# Patient Record
Sex: FEMALE | Race: BLACK OR AFRICAN AMERICAN | NOT HISPANIC OR LATINO | Employment: PART TIME | URBAN - METROPOLITAN AREA
[De-identification: names, ages, dates, MRNs, and addresses within clinical notes are randomized per-mention and may not be internally consistent; named-entity substitution may affect disease eponyms.]

---

## 2017-10-31 ENCOUNTER — OFFICE VISIT (OUTPATIENT)
Dept: UROLOGY | Facility: CLINIC | Age: 52
End: 2017-10-31
Attending: UROLOGY
Payer: COMMERCIAL

## 2017-10-31 VITALS
HEART RATE: 68 BPM | BODY MASS INDEX: 25.48 KG/M2 | RESPIRATION RATE: 16 BRPM | HEIGHT: 69 IN | WEIGHT: 172 LBS | DIASTOLIC BLOOD PRESSURE: 72 MMHG | SYSTOLIC BLOOD PRESSURE: 112 MMHG

## 2017-10-31 DIAGNOSIS — K68.2 RETROPERITONEAL FIBROSIS: Chronic | ICD-10-CM

## 2017-10-31 DIAGNOSIS — Z96.0 RETAINED URETERAL STENT: Primary | ICD-10-CM

## 2017-10-31 DIAGNOSIS — N30.10 INTERSTITIAL CYSTITIS: ICD-10-CM

## 2017-10-31 DIAGNOSIS — N13.5 URETERAL STRICTURE: ICD-10-CM

## 2017-10-31 PROCEDURE — 99214 OFFICE O/P EST MOD 30 MIN: CPT | Mod: S$GLB,,, | Performed by: UROLOGY

## 2017-10-31 PROCEDURE — 99999 PR PBB SHADOW E&M-EST. PATIENT-LVL V: CPT | Mod: PBBFAC,,, | Performed by: UROLOGY

## 2017-10-31 PROCEDURE — 87086 URINE CULTURE/COLONY COUNT: CPT

## 2017-10-31 RX ORDER — HYDROCODONE BITARTRATE AND ACETAMINOPHEN 7.5; 325 MG/1; MG/1
TABLET ORAL
COMMUNITY
Start: 2017-10-09 | End: 2017-11-10

## 2017-10-31 NOTE — PROGRESS NOTES
Subjective:       Lizy Jimenez is a 52 y.o. female who is an established patient of Dr. Zaragoza was seen for evaluation of RPF.      She was last seen 12/15 by [facility]. 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 Also with diagnosis of IC (given Elmiron by [facility]). She was managed with indwelling stents that were changes q3mths since 2005. Stents were changed by [facility] 11/15. After that, she established cared with Dr Smith at .     She has not been seen by this practice in almost 2 years. Last stent exchange by [facility] in 11/15 required R URS due to displaced stent (difficult to interpret op note). She reports last stent exchange by Dr Smith was in 7/16 - she is VERY overdue for stent exchange. She did not bring any records from Dr Smith.     In review of her notes, it is very difficult to understand the etiology/workup of her strictures/RPF. She is s/p XRT for cervical cancer. She states that the stents were in place prior to XRT and were done due to kidney stones. She was offered reimplantation but she did not want to do this due to down time from surgery.      She denies flank pain, hematuria. No symptoms of UTI.       The following portions of the patient's history were reviewed and updated as appropriate: allergies, current medications, past family history, past medical history, past social history, past surgical history and problem list.    Review of Systems  Constitutional: no fever or chills  ENT: no nasal congestion or sore throat  Respiratory: no cough or shortness of breath  Cardiovascular: no chest pain or palpitations  Gastrointestinal: no nausea or vomiting, tolerating diet  Genitourinary: as per HPI  Hematologic/Lymphatic: no easy bruising or lymphadenopathy  Musculoskeletal: no arthralgias or myalgias  Skin: no rashes or lesions  Neurological: no seizures or tremors  Behavioral/Psych: no auditory or visual  "hallucinations        Objective:    Vitals: /72   Pulse 68   Resp 16   Ht 5' 9" (1.753 m)   Wt 78 kg (172 lb)   BMI 25.40 kg/m²     Physical Exam   General: well developed, well nourished in no acute distress  Head: normocephalic, atraumatic  Neck: supple, trachea midline, no obvious enlargement of thyroid  HEENT: EOMI, mucus membranes moist, sclera anicteric, no hearing impairment  Lungs: symmetric expansion, non-labored breathing  Cardiovascular: regular rate and rhythm, normal pulses  Abdomen: soft, non tender, non distended, no palpable masses, no hepatosplenomegaly, no hernias, no CVA tenderness  Musculoskeletal: no peripheral edema, normal ROM in bilateral upper and lower extremities  Lymphatics: no cervical or inguinal lymphadenopathy  Skin: no rashes or lesions  Neuro: alert and oriented x 3, no gross deficits  Psych: normal judgment and insight, normal mood/affect and non-anxious  Genitourinary:   patient declined exam      Lab Review   Urine analysis today in clinic shows positive for nitrites, leukocytes, red blood cells     Lab Results   Component Value Date    WBC 11.62 11/04/2015    HGB 11.4 (L) 11/04/2015    HCT 34.9 (L) 11/04/2015    MCV 94 11/04/2015     11/04/2015     Lab Results   Component Value Date    CREATININE 1.2 11/04/2015    BUN 13 11/04/2015       Imaging  Images and reports were personally reviewed by me and discussed with patient         Assessment/Plan:      1. Retained ureteral stent    - Last exchange 7/16   - Needs stents exchange ASAP. Also needs strictures evaluated. Unclear at this time.    - UCx today   - Discussed possible complications with stent encrustation, stone formation. Stents in place for >1 year   - KUB pre-op to assess for stones on stent     2. Retroperitoneal fibrosis    - Unsure how this was diagnosed     3. Ureteral stricture    - Due to stones or XRT - unsure at this time     4. Interstitial cystitis    - Unsure diagnosis   - Was on Elmiron "         Follow up in 6 weeks post-op

## 2017-11-02 ENCOUNTER — TELEPHONE (OUTPATIENT)
Dept: UROLOGY | Facility: CLINIC | Age: 52
End: 2017-11-02

## 2017-11-02 LAB — BACTERIA UR CULT: NORMAL

## 2017-11-10 ENCOUNTER — HOSPITAL ENCOUNTER (OUTPATIENT)
Dept: PREADMISSION TESTING | Facility: HOSPITAL | Age: 52
Discharge: HOME | End: 2017-11-10
Attending: UROLOGY
Payer: COMMERCIAL

## 2017-11-10 VITALS
RESPIRATION RATE: 18 BRPM | SYSTOLIC BLOOD PRESSURE: 124 MMHG | TEMPERATURE: 97 F | HEIGHT: 69 IN | WEIGHT: 172 LBS | HEART RATE: 67 BPM | DIASTOLIC BLOOD PRESSURE: 76 MMHG | BODY MASS INDEX: 25.48 KG/M2 | OXYGEN SATURATION: 100 %

## 2017-11-10 DIAGNOSIS — Z01.818 PRE-OP TESTING: Primary | ICD-10-CM

## 2017-11-10 LAB
ANION GAP SERPL CALC-SCNC: 9 MMOL/L
BASOPHILS # BLD AUTO: 0.02 K/UL
BASOPHILS NFR BLD: 0.3 %
BUN SERPL-MCNC: 11 MG/DL
CALCIUM SERPL-MCNC: 10.1 MG/DL
CHLORIDE SERPL-SCNC: 101 MMOL/L
CO2 SERPL-SCNC: 29 MMOL/L
CREAT SERPL-MCNC: 1 MG/DL
DIFFERENTIAL METHOD: ABNORMAL
EOSINOPHIL # BLD AUTO: 0.2 K/UL
EOSINOPHIL NFR BLD: 2.6 %
ERYTHROCYTE [DISTWIDTH] IN BLOOD BY AUTOMATED COUNT: 11.4 %
EST. GFR  (AFRICAN AMERICAN): >60 ML/MIN/1.73 M^2
EST. GFR  (NON AFRICAN AMERICAN): >60 ML/MIN/1.73 M^2
GLUCOSE SERPL-MCNC: 112 MG/DL
HCT VFR BLD AUTO: 37.3 %
HGB BLD-MCNC: 12.5 G/DL
LYMPHOCYTES # BLD AUTO: 2.6 K/UL
LYMPHOCYTES NFR BLD: 35.5 %
MCH RBC QN AUTO: 31.2 PG
MCHC RBC AUTO-ENTMCNC: 33.5 G/DL
MCV RBC AUTO: 93 FL
MONOCYTES # BLD AUTO: 0.6 K/UL
MONOCYTES NFR BLD: 7.5 %
NEUTROPHILS # BLD AUTO: 4 K/UL
NEUTROPHILS NFR BLD: 54 %
PLATELET # BLD AUTO: 354 K/UL
PMV BLD AUTO: 9 FL
POTASSIUM SERPL-SCNC: 4.3 MMOL/L
RBC # BLD AUTO: 4.01 M/UL
SODIUM SERPL-SCNC: 139 MMOL/L
WBC # BLD AUTO: 7.44 K/UL

## 2017-11-10 PROCEDURE — 80048 BASIC METABOLIC PNL TOTAL CA: CPT

## 2017-11-10 PROCEDURE — 85025 COMPLETE CBC W/AUTO DIFF WBC: CPT

## 2017-11-10 PROCEDURE — 93005 ELECTROCARDIOGRAM TRACING: CPT

## 2017-11-10 PROCEDURE — 93010 ELECTROCARDIOGRAM REPORT: CPT | Mod: ,,, | Performed by: INTERNAL MEDICINE

## 2017-11-10 NOTE — DISCHARGE INSTRUCTIONS
Your surgery is scheduled for____11/17/2017_____________.    Call [phone] between 2 pm and 5 pm ___11/16/2017_________ to find out your arrival time for the day of surgery.    Report to SAME DAY SURGERY UNIT at _______am on the 2nd floor of the hospital.  Use the front entrance of the hospital before 6 am.  If you need wheelchair assistance, call [phone] from your cell phone,  or call 0 from the courtesy phone in the hospital lobby.    Important instructions:   Do not eat or drink after 12 midnight, including water.  It is okay to brush your teeth.  Do not have gum, candy or mints.     Take only these medications with a small swallow of water on the morning of your surgery.____none_________         Please shower the night before and the morning of your surgery.       Do not wear make- up, including mascara.     You may wear deodorant only.      Do not wear powder, body lotion or cologne.     Do not wear any jewelry or have any metal on your body.     Please bring any documents given to you by your doctor.     If you are going home on the same day of surgery, you must have arrangements for a ride home.  You will not be able to drive home if you were given anesthesia or sedation.     Stop taking Aspirin, Ibuprofen, Motrin and Aleve at least 7 days before your surgery. You may use Tylenol.     Stop taking fish oil and vitamin E for least 7 days before surgery.     Wear loose fitting clothes allowing for bandages.     Please leave money and valuables home.       You may bring your cell phone.     Call the doctor if fever or illness should occur before your surgery.    Call [phone] to contact us here at [facility] if needed.

## 2017-11-16 ENCOUNTER — ANESTHESIA EVENT (OUTPATIENT)
Dept: SURGERY | Facility: HOSPITAL | Age: 52
End: 2017-11-16
Payer: COMMERCIAL

## 2017-11-17 ENCOUNTER — SURGERY (OUTPATIENT)
Age: 52
End: 2017-11-17

## 2017-11-17 ENCOUNTER — HOSPITAL ENCOUNTER (OUTPATIENT)
Facility: HOSPITAL | Age: 52
Discharge: HOME | End: 2017-11-17
Attending: UROLOGY | Admitting: UROLOGY
Payer: COMMERCIAL

## 2017-11-17 ENCOUNTER — ANESTHESIA (OUTPATIENT)
Dept: SURGERY | Facility: HOSPITAL | Age: 52
End: 2017-11-17
Payer: COMMERCIAL

## 2017-11-17 VITALS
BODY MASS INDEX: 25.48 KG/M2 | WEIGHT: 172 LBS | SYSTOLIC BLOOD PRESSURE: 119 MMHG | HEART RATE: 70 BPM | HEIGHT: 69 IN | RESPIRATION RATE: 15 BRPM | DIASTOLIC BLOOD PRESSURE: 80 MMHG | TEMPERATURE: 98 F | OXYGEN SATURATION: 98 %

## 2017-11-17 DIAGNOSIS — N13.5 URETERAL STRICTURE: ICD-10-CM

## 2017-11-17 DIAGNOSIS — Z96.0 RETAINED URETERAL STENT: ICD-10-CM

## 2017-11-17 DIAGNOSIS — N13.5 URETER, STRICTURE: Primary | ICD-10-CM

## 2017-11-17 DIAGNOSIS — K68.2 RETROPERITONEAL FIBROSIS: Chronic | ICD-10-CM

## 2017-11-17 PROCEDURE — 88300 SURGICAL PATH GROSS: CPT | Mod: 26,,,

## 2017-11-17 PROCEDURE — 63600175 PHARM REV CODE 636 W HCPCS: Performed by: NURSE ANESTHETIST, CERTIFIED REGISTERED

## 2017-11-17 PROCEDURE — 71000033 HC RECOVERY, INTIAL HOUR: Performed by: UROLOGY

## 2017-11-17 PROCEDURE — 37000009 HC ANESTHESIA EA ADD 15 MINS: Performed by: UROLOGY

## 2017-11-17 PROCEDURE — 36000707: Performed by: UROLOGY

## 2017-11-17 PROCEDURE — 36000706: Performed by: UROLOGY

## 2017-11-17 PROCEDURE — 63600175 PHARM REV CODE 636 W HCPCS: Performed by: UROLOGY

## 2017-11-17 PROCEDURE — 25000003 PHARM REV CODE 250: Performed by: UROLOGY

## 2017-11-17 PROCEDURE — C1769 GUIDE WIRE: HCPCS | Performed by: UROLOGY

## 2017-11-17 PROCEDURE — C2617 STENT, NON-COR, TEM W/O DEL: HCPCS | Performed by: UROLOGY

## 2017-11-17 PROCEDURE — 71000015 HC POSTOP RECOV 1ST HR: Performed by: UROLOGY

## 2017-11-17 PROCEDURE — 37000008 HC ANESTHESIA 1ST 15 MINUTES: Performed by: UROLOGY

## 2017-11-17 PROCEDURE — 71000016 HC POSTOP RECOV ADDL HR: Performed by: UROLOGY

## 2017-11-17 PROCEDURE — 88300 SURGICAL PATH GROSS: CPT

## 2017-11-17 PROCEDURE — 25500020 PHARM REV CODE 255: Performed by: UROLOGY

## 2017-11-17 PROCEDURE — 76000 FLUOROSCOPY <1 HR PHYS/QHP: CPT | Mod: 26,59,, | Performed by: UROLOGY

## 2017-11-17 PROCEDURE — D9220A PRA ANESTHESIA: Mod: ANES,,, | Performed by: ANESTHESIOLOGY

## 2017-11-17 PROCEDURE — 25000003 PHARM REV CODE 250: Performed by: NURSE ANESTHETIST, CERTIFIED REGISTERED

## 2017-11-17 PROCEDURE — 25000003 PHARM REV CODE 250: Performed by: ANESTHESIOLOGY

## 2017-11-17 PROCEDURE — 74420 UROGRAPHY RTRGR +-KUB: CPT | Mod: 26,,, | Performed by: UROLOGY

## 2017-11-17 PROCEDURE — 52332 CYSTOSCOPY AND TREATMENT: CPT | Mod: 50,,, | Performed by: UROLOGY

## 2017-11-17 PROCEDURE — C1758 CATHETER, URETERAL: HCPCS | Performed by: UROLOGY

## 2017-11-17 PROCEDURE — D9220A PRA ANESTHESIA: Mod: CRNA,,, | Performed by: NURSE ANESTHETIST, CERTIFIED REGISTERED

## 2017-11-17 DEVICE — IMPLANTABLE DEVICE: Type: IMPLANTABLE DEVICE | Site: URETER | Status: FUNCTIONAL

## 2017-11-17 DEVICE — STENT URET PERCUFLEX 6FR 22CM: Type: IMPLANTABLE DEVICE | Site: URETER | Status: FUNCTIONAL

## 2017-11-17 RX ORDER — SODIUM CHLORIDE, SODIUM LACTATE, POTASSIUM CHLORIDE, CALCIUM CHLORIDE 600; 310; 30; 20 MG/100ML; MG/100ML; MG/100ML; MG/100ML
INJECTION, SOLUTION INTRAVENOUS CONTINUOUS
Status: DISCONTINUED | OUTPATIENT
Start: 2017-11-17 | End: 2017-11-17 | Stop reason: HOSPADM

## 2017-11-17 RX ORDER — SODIUM CHLORIDE 0.9 % (FLUSH) 0.9 %
3 SYRINGE (ML) INJECTION
Status: DISCONTINUED | OUTPATIENT
Start: 2017-11-17 | End: 2017-11-17 | Stop reason: HOSPADM

## 2017-11-17 RX ORDER — GLYCOPYRROLATE 0.2 MG/ML
INJECTION INTRAMUSCULAR; INTRAVENOUS
Status: DISCONTINUED | OUTPATIENT
Start: 2017-11-17 | End: 2017-11-17

## 2017-11-17 RX ORDER — PHENAZOPYRIDINE HYDROCHLORIDE 100 MG/1
200 TABLET, FILM COATED ORAL 3 TIMES DAILY PRN
Status: DISCONTINUED | OUTPATIENT
Start: 2017-11-17 | End: 2017-11-17 | Stop reason: HOSPADM

## 2017-11-17 RX ORDER — LORAZEPAM 2 MG/ML
0.25 INJECTION INTRAMUSCULAR ONCE AS NEEDED
Status: DISCONTINUED | OUTPATIENT
Start: 2017-11-17 | End: 2017-11-17 | Stop reason: HOSPADM

## 2017-11-17 RX ORDER — PROPOFOL 10 MG/ML
VIAL (ML) INTRAVENOUS
Status: DISCONTINUED | OUTPATIENT
Start: 2017-11-17 | End: 2017-11-17

## 2017-11-17 RX ORDER — ACETAMINOPHEN 325 MG/1
650 TABLET ORAL EVERY 4 HOURS PRN
Status: DISCONTINUED | OUTPATIENT
Start: 2017-11-17 | End: 2017-11-17 | Stop reason: HOSPADM

## 2017-11-17 RX ORDER — POLYETHYLENE GLYCOL 3350 17 G/17G
POWDER, FOR SOLUTION ORAL
COMMUNITY
End: 2019-07-02 | Stop reason: ALTCHOICE

## 2017-11-17 RX ORDER — FENTANYL CITRATE 50 UG/ML
INJECTION, SOLUTION INTRAMUSCULAR; INTRAVENOUS
Status: DISCONTINUED | OUTPATIENT
Start: 2017-11-17 | End: 2017-11-17

## 2017-11-17 RX ORDER — PHENAZOPYRIDINE HYDROCHLORIDE 100 MG/1
200 TABLET, FILM COATED ORAL
Qty: 18 TABLET | Refills: 0 | Status: SHIPPED | OUTPATIENT
Start: 2017-11-17 | End: 2019-07-02 | Stop reason: ALTCHOICE

## 2017-11-17 RX ORDER — HYDROMORPHONE HYDROCHLORIDE 2 MG/ML
0.2 INJECTION, SOLUTION INTRAMUSCULAR; INTRAVENOUS; SUBCUTANEOUS EVERY 5 MIN PRN
Status: DISCONTINUED | OUTPATIENT
Start: 2017-11-17 | End: 2017-11-17 | Stop reason: HOSPADM

## 2017-11-17 RX ORDER — HYDROMORPHONE HYDROCHLORIDE 2 MG/ML
0.5 INJECTION, SOLUTION INTRAMUSCULAR; INTRAVENOUS; SUBCUTANEOUS EVERY 4 HOURS PRN
Status: DISCONTINUED | OUTPATIENT
Start: 2017-11-17 | End: 2017-11-17 | Stop reason: HOSPADM

## 2017-11-17 RX ORDER — LIDOCAINE HCL/PF 100 MG/5ML
SYRINGE (ML) INTRAVENOUS
Status: DISCONTINUED | OUTPATIENT
Start: 2017-11-17 | End: 2017-11-17

## 2017-11-17 RX ORDER — PHENYLEPHRINE HYDROCHLORIDE 10 MG/ML
INJECTION INTRAVENOUS
Status: DISCONTINUED | OUTPATIENT
Start: 2017-11-17 | End: 2017-11-17

## 2017-11-17 RX ORDER — MIDAZOLAM HYDROCHLORIDE 1 MG/ML
INJECTION, SOLUTION INTRAMUSCULAR; INTRAVENOUS
Status: DISCONTINUED | OUTPATIENT
Start: 2017-11-17 | End: 2017-11-17

## 2017-11-17 RX ORDER — LIDOCAINE HYDROCHLORIDE 10 MG/ML
1 INJECTION, SOLUTION EPIDURAL; INFILTRATION; INTRACAUDAL; PERINEURAL ONCE
Status: DISCONTINUED | OUTPATIENT
Start: 2017-11-17 | End: 2017-11-17 | Stop reason: HOSPADM

## 2017-11-17 RX ORDER — HYDROCODONE BITARTRATE AND ACETAMINOPHEN 5; 325 MG/1; MG/1
1 TABLET ORAL EVERY 6 HOURS PRN
Qty: 8 TABLET | Refills: 0 | Status: SHIPPED | OUTPATIENT
Start: 2017-11-17 | End: 2019-07-02 | Stop reason: ALTCHOICE

## 2017-11-17 RX ORDER — ONDANSETRON 2 MG/ML
4 INJECTION INTRAMUSCULAR; INTRAVENOUS EVERY 12 HOURS PRN
Status: DISCONTINUED | OUTPATIENT
Start: 2017-11-17 | End: 2017-11-17 | Stop reason: HOSPADM

## 2017-11-17 RX ORDER — METOCLOPRAMIDE HYDROCHLORIDE 5 MG/ML
INJECTION INTRAMUSCULAR; INTRAVENOUS
Status: DISCONTINUED | OUTPATIENT
Start: 2017-11-17 | End: 2017-11-17

## 2017-11-17 RX ORDER — ONDANSETRON 2 MG/ML
INJECTION INTRAMUSCULAR; INTRAVENOUS
Status: DISCONTINUED | OUTPATIENT
Start: 2017-11-17 | End: 2017-11-17

## 2017-11-17 RX ORDER — SULFAMETHOXAZOLE AND TRIMETHOPRIM 800; 160 MG/1; MG/1
1 TABLET ORAL 2 TIMES DAILY
Qty: 4 TABLET | Refills: 0 | Status: SHIPPED | OUTPATIENT
Start: 2017-11-17 | End: 2017-11-20

## 2017-11-17 RX ORDER — CEFAZOLIN SODIUM 2 G/50ML
2 SOLUTION INTRAVENOUS
Status: COMPLETED | OUTPATIENT
Start: 2017-11-17 | End: 2017-11-17

## 2017-11-17 RX ORDER — FENTANYL CITRATE 50 UG/ML
25 INJECTION, SOLUTION INTRAMUSCULAR; INTRAVENOUS EVERY 5 MIN PRN
Status: DISCONTINUED | OUTPATIENT
Start: 2017-11-17 | End: 2017-11-17 | Stop reason: HOSPADM

## 2017-11-17 RX ORDER — HYDROCODONE BITARTRATE AND ACETAMINOPHEN 5; 325 MG/1; MG/1
1 TABLET ORAL EVERY 4 HOURS PRN
Status: DISCONTINUED | OUTPATIENT
Start: 2017-11-17 | End: 2017-11-17 | Stop reason: HOSPADM

## 2017-11-17 RX ADMIN — GLYCOPYRROLATE 0.2 MG: 0.2 INJECTION, SOLUTION INTRAMUSCULAR; INTRAVENOUS at 08:11

## 2017-11-17 RX ADMIN — PROPOFOL 20 MG: 10 INJECTION, EMULSION INTRAVENOUS at 09:11

## 2017-11-17 RX ADMIN — IOHEXOL 100 ML: 300 INJECTION, SOLUTION INTRAVENOUS at 09:11

## 2017-11-17 RX ADMIN — CEFAZOLIN SODIUM 2 G: 2 SOLUTION INTRAVENOUS at 09:11

## 2017-11-17 RX ADMIN — SODIUM CHLORIDE, SODIUM LACTATE, POTASSIUM CHLORIDE, AND CALCIUM CHLORIDE: .6; .31; .03; .02 INJECTION, SOLUTION INTRAVENOUS at 07:11

## 2017-11-17 RX ADMIN — PHENYLEPHRINE HYDROCHLORIDE 200 MCG: 10 INJECTION INTRAVENOUS at 09:11

## 2017-11-17 RX ADMIN — PROPOFOL 140 MG: 10 INJECTION, EMULSION INTRAVENOUS at 09:11

## 2017-11-17 RX ADMIN — METOCLOPRAMIDE 10 MG: 5 INJECTION, SOLUTION INTRAMUSCULAR; INTRAVENOUS at 08:11

## 2017-11-17 RX ADMIN — ONDANSETRON 4 MG: 2 INJECTION, SOLUTION INTRAMUSCULAR; INTRAVENOUS at 08:11

## 2017-11-17 RX ADMIN — FENTANYL CITRATE 100 MCG: 50 INJECTION INTRAMUSCULAR; INTRAVENOUS at 09:11

## 2017-11-17 RX ADMIN — HYDROCODONE BITARTRATE AND ACETAMINOPHEN 1 TABLET: 5; 325 TABLET ORAL at 10:11

## 2017-11-17 RX ADMIN — PHENAZOPYRIDINE HYDROCHLORIDE 200 MG: 100 TABLET ORAL at 10:11

## 2017-11-17 RX ADMIN — LIDOCAINE HYDROCHLORIDE 100 MG: 20 INJECTION, SOLUTION INTRAVENOUS at 09:11

## 2017-11-17 RX ADMIN — ONDANSETRON 4 MG: 2 INJECTION, SOLUTION INTRAMUSCULAR; INTRAVENOUS at 12:11

## 2017-11-17 RX ADMIN — MIDAZOLAM HYDROCHLORIDE 2 MG: 1 INJECTION, SOLUTION INTRAMUSCULAR; INTRAVENOUS at 08:11

## 2017-11-17 NOTE — PLAN OF CARE
Problem: Patient Care Overview  Goal: Plan of Care Review  Outcome: Ongoing (interventions implemented as appropriate)   11/17/17 1035   Coping/Psychosocial   Plan Of Care Reviewed With patient     Nick score 10/10.  VSS per flow sheet. AAOx4; drowsy. No n/v noted. Denies pain. See chart for full assessment.     Problem: Surgery Nonspecified (Adult)  Goal: Signs and Symptoms of Listed Potential Problems Will be Absent, Minimized or Managed (Surgery Nonspecified)  Signs and symptoms of listed potential problems will be absent, minimized or managed by discharge/transition of care (reference Surgery Nonspecified (Adult) CPG).   Outcome: Ongoing (interventions implemented as appropriate)   11/17/17 1035   Surgery Nonspecified   Problems Assessed (Surgery) bleeding/anemia;pain;postoperative nausea and vomiting;respiratory compromise;situational response   Problems Present (Surgery) none     Goal: Anesthesia/Sedation Recovery  Outcome: Outcome(s) achieved Date Met: 11/17/17 11/17/17 1035   Goal/Outcome Evaluation   Anesthesia/Sedation Recovery recovered to baseline;criteria met for transfer

## 2017-11-17 NOTE — ANESTHESIA POSTPROCEDURE EVALUATION
"Anesthesia Post Evaluation    Patient: Lizy Jimenez    Procedure(s) Performed: Procedure(s) (LRB):  CYSTOSCOPY WITH STENT REMOVAL (Bilateral)  PYELOGRAM-RETROGRADE (Bilateral)  PLACEMENT-STENT (Bilateral)    Final Anesthesia Type: general  Patient location during evaluation: PACU  Patient participation: Yes- Able to Participate  Level of consciousness: awake and alert, oriented and awake  Post-procedure vital signs: reviewed and stable  Pain management: adequate  Airway patency: patent  PONV status at discharge: No PONV  Anesthetic complications: no      Cardiovascular status: blood pressure returned to baseline  Respiratory status: unassisted and spontaneous ventilation  Hydration status: euvolemic  Follow-up not needed.        Visit Vitals  /82   Pulse 90   Temp 36.5 °C (97.7 °F) (Oral)   Resp (!) 9   Ht 5' 9" (1.753 m)   Wt 78 kg (172 lb)   SpO2 99%   Breastfeeding? No   BMI 25.40 kg/m²       Pain/Nick Score: Pain Assessment Performed: Yes (11/17/2017 10:00 AM)  Presence of Pain: denies (11/17/2017 10:00 AM)  Nick Score: 8 (11/17/2017 10:00 AM)      "

## 2017-11-17 NOTE — INTERVAL H&P NOTE
The patient has been examined and the H&P has been reviewed:    I concur with the findings and no changes have occurred since H&P was written.    Anesthesia/Surgery risks, benefits and alternative options discussed and understood by patient/family.          Active Hospital Problems    Diagnosis  POA    Retained ureteral stent [Z96.0]  Not Applicable      Resolved Hospital Problems    Diagnosis Date Resolved POA   No resolved problems to display.

## 2017-11-17 NOTE — ANESTHESIA PREPROCEDURE EVALUATION
11/17/2017  Lizy Jimenez is a 52 y.o., female.    Anesthesia Evaluation    I have reviewed the Patient Summary Reports.     I have reviewed the Medications.     Review of Systems  Anesthesia Hx:  No problems with previous Anesthesia   Denies Personal Hx of Anesthesia complications.   Hematology/Oncology:  Hematology Normal   Oncology Normal     EENT/Dental:EENT/Dental Normal   Cardiovascular:   Hypertension    Pulmonary:  Pulmonary Normal    Renal/:   Chronic Renal Disease    Hepatic/GI:  Hepatic/GI Normal    Musculoskeletal:  Musculoskeletal Normal    Neurological:  Neurology Normal    Endocrine:  Endocrine Normal    Dermatological:  Skin Normal    Psych:  Psychiatric Normal           Physical Exam  General:  Well nourished    Airway/Jaw/Neck:  Airway Findings: Mouth Opening: Normal Tongue: Normal  Mallampati: II      Dental:  Dental Findings: In tact   Chest/Lungs:  Chest/Lungs Clear    Heart/Vascular:  Heart Findings: Normal Heart murmur: negative       Mental Status:  Mental Status Findings:  Cooperative, Alert and Oriented         Anesthesia Plan  Type of Anesthesia, risks & benefits discussed:  Anesthesia Type:  general, MAC, regional  Patient's Preference:   Intra-op Monitoring Plan: standard ASA monitors  Intra-op Monitoring Plan Comments:   Post Op Pain Control Plan: multimodal analgesia  Post Op Pain Control Plan Comments:   Induction:   IV  Beta Blocker:  Patient is not currently on a Beta-Blocker (No further documentation required).       Informed Consent: Patient understands risks and agrees with Anesthesia plan.  Questions answered. Anesthesia consent signed with patient.  ASA Score: 2     Day of Surgery Review of History & Physical:            Ready For Surgery From Anesthesia Perspective.

## 2017-11-17 NOTE — BRIEF OP NOTE
[facility]  Brief Operative Note     SUMMARY     Surgery Date: 11/17/2017     Surgeon(s) and Role:     * Dena Paula MD - Primary    Assisting Surgeon: None    Pre-op Diagnosis:  Ureteral stricture [N13.5]  Retained ureteral stent [Z96.0]    Post-op Diagnosis:  Post-Op Diagnosis Codes:     * Ureteral stricture [N13.5]     * Retained ureteral stent [Z96.0]    Procedure(s) (LRB):  CYSTOSCOPY WITH STENT REMOVAL (Bilateral)  PYELOGRAM-RETROGRADE (Bilateral)  PLACEMENT-STENT (Bilateral) - 2 stents on L, 1 stent on R    Anesthesia: Choice    Description of the findings of the procedure: No excrustation on stents despite being in place >1 year. Narrowed ureters distally bilaterally, R>L. R hydronephrosis. Partially duplicated R, totally duplicated L.    Findings/Key Components: R - 6Fr x 22cm. LUP - 6Fr x 22cm, LLP - 6Fr x 20cm    Estimated Blood Loss: 5cm         Specimens:   Specimen (12h ago through future)    Start     Ordered    11/17/17 0929  Specimen to Pathology - Surgery  Once     Comments:  Right x1 / left x2 ureteral stents GROSS ONLY      11/17/17 0946          Discharge Note    SUMMARY     Admit Date: 11/17/2017    Discharge Date and Time:  11/17/2017 1:32 PM    Hospital Course (synopsis of major diagnoses, care, treatment, and services provided during the course of the hospital stay): Uncomplicated stent exchange     Final Diagnosis: Post-Op Diagnosis Codes:     * Ureteral stricture [N13.5]     * Retained ureteral stent [Z96.0]    Disposition: Home or Self Care    Follow Up/Patient Instructions:     Medications:  Reconciled Home Medications:   Current Discharge Medication List      START taking these medications    Details   hydrocodone-acetaminophen 5-325mg (NORCO) 5-325 mg per tablet Take 1 tablet by mouth every 6 (six) hours as needed for Pain.  Qty: 8 tablet, Refills: 0      phenazopyridine (PYRIDIUM) 100 MG tablet Take 2 tablets (200 mg total) by mouth 3 (three) times daily  with meals.  Qty: 18 tablet, Refills: 0      sulfamethoxazole-trimethoprim 800-160mg (BACTRIM DS) 800-160 mg Tab Take 1 tablet by mouth 2 (two) times daily.  Qty: 4 tablet, Refills: 0         CONTINUE these medications which have NOT CHANGED    Details   lisinopril 10 MG tablet Take 10 mg by mouth once daily.   Refills: 0      polyethylene glycol (GLYCOLAX) 17 gram PwPk Take by mouth as needed.             Discharge Procedure Orders  Diet general     Activity as tolerated     Call MD for:  temperature >100.4     Call MD for:  persistent nausea and vomiting     Call MD for:  severe uncontrolled pain     Call MD for:  difficulty breathing, headache or visual disturbances     No dressing needed       Follow-up Information     Dena Paula MD In 6 weeks.    Specialty:  Urology  Why:  For post-op follow up  Contact information:  [address] [place] [id number]  [phone]                   #[id number]

## 2017-11-17 NOTE — TRANSFER OF CARE
"Anesthesia Transfer of Care Note    Patient: Lziy Jimenez    Procedure(s) Performed: Procedure(s) (LRB):  CYSTOSCOPY WITH STENT REMOVAL (Bilateral)  PYELOGRAM-RETROGRADE (Bilateral)  PLACEMENT-STENT (Bilateral)    Patient location: PACU    Anesthesia Type: general    Transport from OR: Transported from OR on room air with adequate spontaneous ventilation    Post pain: adequate analgesia    Post assessment: no apparent anesthetic complications and tolerated procedure well    Post vital signs: stable    Level of consciousness: awake, alert and oriented    Nausea/Vomiting: no nausea/vomiting    Complications: none    Transfer of care protocol was followed      Last vitals:   Visit Vitals  /78   Pulse 100   Temp 36.5 °C (97.7 °F) (Oral)   Resp 14   Ht 5' 9" (1.753 m)   Wt 78 kg (172 lb)   SpO2 100%   Breastfeeding? No   BMI 25.40 kg/m²     "

## 2017-11-18 NOTE — OP NOTE
DATE OF PROCEDURE:  11/17/2017    SURGEON:  Dena Paula M.D.    PREOPERATIVE DIAGNOSIS:  Ureteral stricture and retained ureteral stent.    POSTOPERATIVE DIAGNOSIS: Ureteral stricture and retained ureteral stent.    PROCEDURE PERFORMED:  Cystoscopy with bilateral stent removal, bilateral   retrograde pyelogram and bilateral ureteral stent placement with 2 stents placed   on the left.    INDICATIONS FOR PROCEDURE:  Ms. Jimenez is a 52-year-old female who presented to   my clinic recently with a known history of ureteral strictures and possible   retroperitoneal fibrosis.  She is status post radiation therapy for cervical   cancer.  She was previously managed by Dr. Zaragoza with indwelling ureteral   stents.  Since that time, she was managed at an outside hospital, last stent   exchange was July 2016.  She had been getting these changed every 3 months.  She   is now approximately a year and a half since her last change.  She presents   today for exchange of her ureteral stents.  She has a known completely   duplicated system on the left.    PROCEDURE:  Ms. Jimenez was brought to the Operating Room and placed under   general LMA anesthesia.  Full timeout procedures were performed identifying the   correct patient and procedure.  Appropriate IV antibiotics with Ancef was given   prior to commencement of surgery.  The patient was placed in dorsal lithotomy   position and prepped and draped in the usual sterile fashion.    Preoperatively, she underwent a KUB, which showed excessive curls of the stent   due to the type of stent that was placed at the outside hospital.  There was no   definitive stone encrustation that was able to be visualized on KUB.     imaging was done with fluoroscopy in the Operating Room and again confirmed   this.  A 22-Kyrgyz rigid cystoscope was passed per urethra and into the bladder.    The stents were examined and were noted to be dark in nature, but had no stone   encrustation.   First the right side was addressed and a sensor wire was passed   alongside the stent and up to the level of the kidney.  Once the wire was in   place, the stent was grasped and removed in its entirety.  The stent was able to   be removed with minimal to no resistance.  Next, a dual lumen exchange catheter   was passed over the wire and a retrograde pyelogram was performed with full   strength Omnipaque solution.  There was noted to be narrow distal ureter and   moderate hydroureteronephrosis proximal to this.  She was noted to have a   partially duplicated system, but one ureter down from the proximal ureter down   to the bladder.  Next, a cystoscope was replaced back into the bladder over the   wire.  A 6-Martiniquais x 22 cm double-J ureteral stent with no string was then passed   over the wire and up to the level of the kidney.  The wire was removed and good   curls noted in the proximal and distal portion of the stent.    Similar procedure was then performed on the patient's left side.  Initially, the   upper pole was addressed and the wire was passed alongside the stent and up to   the level of the kidney.  The stent was then removed.  A similar procedure was   performed for the lower pole moiety where stent was passed alongside the stent   and up to the level of the kidney.  Both stents were then removed without issue   under fluoroscopic guidance.  There was no resistance in removal of the stent.    A dual-lumen exchange catheter was then used to perform retrograde pyelogram per   upper pole moiety and then a lower pole moiety.  Again, there was narrowing of   the distal ureter.  It is difficult in getting contrast to the area of the upper   pole moiety of the kidney.    The cystoscope was replaced back into the bladder and first the upper pole   moiety stent was placed.  A 6-Martiniquais x 22 cm JJ stent was then passed over the   wire and up to the level of the kidney.  The wire was removed and coil was noted   in the  proximal portion and distal portion of the stent.  Similar procedure was   then performed on the lower pole moiety where a 6-Kinyarwanda x 20 cm JJ stent was   then passed over the wire to the level of the kidney.  The wire was then removed   and good curl is noted in the proximal and distal portion of the stent.  At the   conclusion of the procedure, all 3 stents were in the proper position.  There   was minimal to no hematuria noted from the ureters.    The bladder was then drained and the cystoscope was removed.  The patient was   awakened from general anesthesia and transferred to the PACU in stable   condition.    COMPLICATIONS:  None.    FINDINGS:  No encrustation of stent despite being in place for greater than one   year.  Narrow distal ureters bilaterally on retrograde pyelogram, right greater   than left.  Right hydronephrosis.  Partially duplicated right system, entirely   duplicated left system.    ESTIMATED BLOOD LOSS:  5 mL.    SPECIMENS:  Ureteral stents.    DRAINS:  The right 6-Kinyarwanda x 22 JJ stent, left upper pole moiety 6-Kinyarwanda x 22   cm JJ, the left lower pole moiety 6-Kinyarwanda x 20 cm double-J ureteral stent.    PATIENT DISPOSITION:  The patient is stable.  She will be discharged home.  She   will follow up in approximately 6 weeks.  We will likely need to exchange her   stent in approximately four to six months.  She did not have any encrustation,   so doubt that stents need to be changed q. 3 months.      EKP/IN  dd: 11/17/2017 13:40:08 (CST)  td: 11/17/2017 21:37:26 (CST)  Doc ID   #[id number]  Job ID #[id number]    CC:

## 2017-12-12 ENCOUNTER — OFFICE VISIT (OUTPATIENT)
Dept: UROLOGY | Facility: CLINIC | Age: 52
End: 2017-12-12
Payer: COMMERCIAL

## 2017-12-12 VITALS
SYSTOLIC BLOOD PRESSURE: 104 MMHG | BODY MASS INDEX: 25.47 KG/M2 | WEIGHT: 171.94 LBS | RESPIRATION RATE: 14 BRPM | HEART RATE: 68 BPM | HEIGHT: 69 IN | DIASTOLIC BLOOD PRESSURE: 60 MMHG

## 2017-12-12 DIAGNOSIS — K68.2 RETROPERITONEAL FIBROSIS: Chronic | ICD-10-CM

## 2017-12-12 DIAGNOSIS — N13.5 URETERAL STRICTURE: Primary | ICD-10-CM

## 2017-12-12 DIAGNOSIS — N30.10 INTERSTITIAL CYSTITIS: ICD-10-CM

## 2017-12-12 DIAGNOSIS — N13.1 HYDRONEPHROSIS WITH URETERAL STRICTURE, NOT ELSEWHERE CLASSIFIED: ICD-10-CM

## 2017-12-12 PROCEDURE — 87186 SC STD MICRODIL/AGAR DIL: CPT

## 2017-12-12 PROCEDURE — 99214 OFFICE O/P EST MOD 30 MIN: CPT | Mod: S$GLB,,, | Performed by: UROLOGY

## 2017-12-12 PROCEDURE — 87086 URINE CULTURE/COLONY COUNT: CPT

## 2017-12-12 PROCEDURE — 99999 PR PBB SHADOW E&M-EST. PATIENT-LVL III: CPT | Mod: PBBFAC,,, | Performed by: UROLOGY

## 2017-12-12 PROCEDURE — 87077 CULTURE AEROBIC IDENTIFY: CPT

## 2017-12-12 PROCEDURE — 87088 URINE BACTERIA CULTURE: CPT

## 2017-12-12 NOTE — PROGRESS NOTES
Subjective:       Lizy Jimenez is a 52 y.o. female who is an established patient of Dr. Zaragoza was seen for evaluation of RPF.      She was last seen 12/15 by [facility]. 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 Also with diagnosis of IC (given Elmiron by R[facility]). She was managed with indwelling stents that were changes q3mths since 2005. Stents were changed by [facility] 11/15. After that, she established cared with Dr Smith at .     She has not been seen by this practice in almost 2 years. Last stent exchange by [facility] in 11/15 required R URS due to displaced stent (difficult to interpret op note). She reports last stent exchange by Dr Smith was in 7/16 - she is VERY overdue for stent exchange. She did not bring any records from Dr Smith.     In review of her notes, it is very difficult to understand the etiology/workup of her strictures/RPF. She is s/p XRT for cervical cancer. She states that the stents were in place prior to XRT and were done due to kidney stones. She was offered reimplantation but she did not want to do this due to down time from surgery.      Stent exchange (uncomplicated) on 11/17/17. R - 6Fr x 22cm. LUP - 6Fr x 22cm, LLP - 6Fr x 20cm    Now with dysuria. UA concerning for UTI.       The following portions of the patient's history were reviewed and updated as appropriate: allergies, current medications, past family history, past medical history, past social history, past surgical history and problem list.    Review of Systems  Constitutional: no fever or chills  ENT: no nasal congestion or sore throat  Respiratory: no cough or shortness of breath  Cardiovascular: no chest pain or palpitations  Gastrointestinal: no nausea or vomiting, tolerating diet  Genitourinary: as per HPI  Hematologic/Lymphatic: no easy bruising or lymphadenopathy  Musculoskeletal: no arthralgias or myalgias  Skin: no rashes or  "lesions  Neurological: no seizures or tremors  Behavioral/Psych: no auditory or visual hallucinations        Objective:    Vitals: /60   Pulse 68   Resp 14   Ht 5' 9" (1.753 m)   Wt 78 kg (171 lb 15.3 oz)   BMI 25.39 kg/m²     Physical Exam   General: well developed, well nourished in no acute distress  Head: normocephalic, atraumatic  Neck: supple, trachea midline, no obvious enlargement of thyroid  HEENT: EOMI, mucus membranes moist, sclera anicteric, no hearing impairment  Lungs: symmetric expansion, non-labored breathing  Cardiovascular: regular rate and rhythm, normal pulses  Abdomen: soft, non tender, non distended, no palpable masses, no hepatosplenomegaly, no hernias, no CVA tenderness  Musculoskeletal: no peripheral edema, normal ROM in bilateral upper and lower extremities  Lymphatics: no cervical or inguinal lymphadenopathy  Skin: no rashes or lesions  Neuro: alert and oriented x 3, no gross deficits  Psych: normal judgment and insight, normal mood/affect and non-anxious  Genitourinary:   patient declined exam      Lab Review   Urine analysis today in clinic shows positive for nitrites, leukocytes, red blood cells 50    Lab Results   Component Value Date    WBC 7.44 11/10/2017    HGB 12.5 11/10/2017    HCT 37.3 11/10/2017    MCV 93 11/10/2017     (H) 11/10/2017     Lab Results   Component Value Date    CREATININE 1.0 11/10/2017    BUN 11 11/10/2017       Imaging  Images and reports were personally reviewed by me and discussed with patient         Assessment/Plan:      1. Retained ureteral stent    - Stent exchange delayed >1 year   - Stents exchanged by myself on 11/17/17 without complication despite prolonged indwelling time. Minimal encrustation.   - Okay to plan for stent exchange in 6 months   - Discussed referral to Dr Santacruz for definitive repair   - UCx today      2. Retroperitoneal fibrosis    - Unsure how this was diagnosed     3. Ureteral stricture    - Due to stones or XRT - " unsure at this time     4. Interstitial cystitis    - Unsure diagnosis   - Was on Elmiron         Follow up in 5 months to arrange for stent exchange

## 2017-12-15 ENCOUNTER — TELEPHONE (OUTPATIENT)
Dept: UROLOGY | Facility: CLINIC | Age: 52
End: 2017-12-15

## 2017-12-15 ENCOUNTER — TELEPHONE (OUTPATIENT)
Dept: UROLOGY | Facility: HOSPITAL | Age: 52
End: 2017-12-15

## 2017-12-15 LAB — BACTERIA UR CULT: NORMAL

## 2017-12-15 RX ORDER — NITROFURANTOIN 25; 75 MG/1; MG/1
100 CAPSULE ORAL 2 TIMES DAILY
Qty: 20 CAPSULE | Refills: 0 | Status: SHIPPED | OUTPATIENT
Start: 2017-12-15 | End: 2017-12-25

## 2017-12-15 NOTE — TELEPHONE ENCOUNTER
Please inform patient of urinary tract infection on recent urine culture. Appropriate antibiotics (Macrobid) were sent in to the pharmacy.

## 2019-06-25 ENCOUNTER — TELEPHONE (OUTPATIENT)
Dept: UROLOGY | Facility: CLINIC | Age: 54
End: 2019-06-25

## 2019-06-25 NOTE — TELEPHONE ENCOUNTER
Lt message for pt to contact office to see why pt is needing a sooner appt an also appt can be made with rosy landin.-todd

## 2019-06-25 NOTE — TELEPHONE ENCOUNTER
----- Message from Tiffany Levy sent at 6/25/2019 10:46 AM CDT -----  Contact: PT  PT is calling requesting an early morning appointment for anything really soon    Callback: [phone]

## 2019-07-02 ENCOUNTER — OFFICE VISIT (OUTPATIENT)
Dept: UROLOGY | Facility: CLINIC | Age: 54
End: 2019-07-02
Payer: COMMERCIAL

## 2019-07-02 VITALS
HEIGHT: 69 IN | DIASTOLIC BLOOD PRESSURE: 60 MMHG | BODY MASS INDEX: 25.18 KG/M2 | SYSTOLIC BLOOD PRESSURE: 104 MMHG | WEIGHT: 170 LBS

## 2019-07-02 DIAGNOSIS — Z96.0 RETAINED URETERAL STENT: Primary | ICD-10-CM

## 2019-07-02 DIAGNOSIS — K68.2 RETROPERITONEAL FIBROSIS: Chronic | ICD-10-CM

## 2019-07-02 DIAGNOSIS — K68.2 RETROPERITONEAL FIBROSIS: ICD-10-CM

## 2019-07-02 DIAGNOSIS — N13.5 URETERAL STRICTURE: ICD-10-CM

## 2019-07-02 DIAGNOSIS — N13.1 HYDRONEPHROSIS WITH URETERAL STRICTURE, NOT ELSEWHERE CLASSIFIED: ICD-10-CM

## 2019-07-02 LAB
BILIRUB SERPL-MCNC: NORMAL MG/DL
BLOOD URINE, POC: 50
COLOR, POC UA: YELLOW
GLUCOSE UR QL STRIP: 1000
KETONES UR QL STRIP: NORMAL
LEUKOCYTE ESTERASE URINE, POC: NORMAL
NITRITE, POC UA: POSITIVE
PH, POC UA: 5
PROTEIN, POC: NORMAL
SPECIFIC GRAVITY, POC UA: 1010
UROBILINOGEN, POC UA: NORMAL

## 2019-07-02 PROCEDURE — 87086 URINE CULTURE/COLONY COUNT: CPT

## 2019-07-02 PROCEDURE — 87088 URINE BACTERIA CULTURE: CPT

## 2019-07-02 PROCEDURE — 81001 URINALYSIS AUTO W/SCOPE: CPT | Mod: S$GLB,,, | Performed by: NURSE PRACTITIONER

## 2019-07-02 PROCEDURE — 3008F BODY MASS INDEX DOCD: CPT | Mod: CPTII,S$GLB,, | Performed by: NURSE PRACTITIONER

## 2019-07-02 PROCEDURE — 81001 PR  URINALYSIS, AUTO, W/SCOPE: ICD-10-PCS | Mod: S$GLB,,, | Performed by: NURSE PRACTITIONER

## 2019-07-02 PROCEDURE — 87077 CULTURE AEROBIC IDENTIFY: CPT

## 2019-07-02 PROCEDURE — 99999 PR PBB SHADOW E&M-EST. PATIENT-LVL III: CPT | Mod: PBBFAC,,, | Performed by: NURSE PRACTITIONER

## 2019-07-02 PROCEDURE — 99214 PR OFFICE/OUTPT VISIT, EST, LEVL IV, 30-39 MIN: ICD-10-PCS | Mod: 25,S$GLB,, | Performed by: NURSE PRACTITIONER

## 2019-07-02 PROCEDURE — 99214 OFFICE O/P EST MOD 30 MIN: CPT | Mod: 25,S$GLB,, | Performed by: NURSE PRACTITIONER

## 2019-07-02 PROCEDURE — 87186 SC STD MICRODIL/AGAR DIL: CPT

## 2019-07-02 PROCEDURE — 3008F PR BODY MASS INDEX (BMI) DOCUMENTED: ICD-10-PCS | Mod: CPTII,S$GLB,, | Performed by: NURSE PRACTITIONER

## 2019-07-02 PROCEDURE — 99999 PR PBB SHADOW E&M-EST. PATIENT-LVL III: ICD-10-PCS | Mod: PBBFAC,,, | Performed by: NURSE PRACTITIONER

## 2019-07-02 RX ORDER — ATORVASTATIN CALCIUM 20 MG/1
20 TABLET, FILM COATED ORAL DAILY
COMMUNITY

## 2019-07-02 NOTE — PROGRESS NOTES
"Subjective:       Patient ID: Lizy Jimenez is a 53 y.o. female who is an established patient of Dr. Paula though new to me was last seen in this office 12/12/17    Chief Complaint:   Chief Complaint   Patient presents with    Other     Discuss procedure and follow up.. states she has been fine      Per Dr. Paula:  She was last seen 12/15 by [facility]. 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 Also with diagnosis of IC (given Elmiron by [facility]). She was managed with indwelling stents that were changes q3mths since 2005. Stents were changed by [facility] 11/15. After that, she established cared with Dr Smith at .     She has not been seen by this practice in almost 2 years. Last stent exchange by [facility] in 11/15 required R URS due to displaced stent (difficult to interpret op note). She reports last stent exchange by Dr Smith was in 7/16 - she is VERY overdue for stent exchange. She did not bring any records from Dr Smith.     In review of her notes, it is very difficult to understand the etiology/workup of her strictures/RPF. She is s/p XRT for cervical cancer. She states that the stents were in place prior to XRT and were done due to kidney stones. She was offered reimplantation but she did not want to do this due to down time from surgery.      Stent exchange (uncomplicated) on 11/17/17. R - 6Fr x 22cm. LUP - 6Fr x 22cm, LLP - 6Fr x 20cm    Treated with Macrobid for UTI in 12/2017 by Dr. Paula    7/2/19  She has not been seen in this practice for ~ 1.5 years. She is here today to schedule stent exchange. She reports occasional "stinging" with urination.  UA today concerning for UTI.  Denies gross hematuria, flank pain or fever    ACTIVE MEDICAL ISSUES:  Patient Active Problem List   Diagnosis    Ureteral stricture    Hydronephrosis    Stricture or kinking of ureter    Incomplete bladder emptying    Urinary tract infection (UTI) " "   Nocturia    Interstitial cystitis    Ureter, stricture    Retroperitoneal fibrosis    Gross hematuria    Ureteral stent displacement    Retained ureteral stent       ALLERGIES AND MEDICATIONS: updated and reviewed.  Review of patient's allergies indicates:  No Known Allergies  Current Outpatient Medications   Medication Sig    atorvastatin (LIPITOR) 20 MG tablet Take 20 mg by mouth once daily.    lisinopril 10 MG tablet Take 10 mg by mouth once daily.      No current facility-administered medications for this visit.        Review of Systems   Constitutional: Negative for activity change, chills, fatigue, fever and unexpected weight change.   Eyes: Negative for discharge, redness and visual disturbance.   Respiratory: Negative for cough, shortness of breath and wheezing.    Cardiovascular: Negative for chest pain and leg swelling.   Gastrointestinal: Negative for abdominal distention, abdominal pain, constipation, diarrhea, nausea and vomiting.   Genitourinary: Negative for decreased urine volume, difficulty urinating, flank pain, frequency, hematuria, pelvic pain, urgency, vaginal bleeding, vaginal discharge and vaginal pain.   Musculoskeletal: Negative for arthralgias, joint swelling and myalgias.   Skin: Negative for color change and rash.   Neurological: Negative for dizziness and light-headedness.   Psychiatric/Behavioral: Negative for behavioral problems and confusion. The patient is not nervous/anxious.        Objective:      Vitals:    07/02/19 0955   BP: 104/60   Weight: 77.1 kg (170 lb)   Height: 5' 9" (1.753 m)     Physical Exam   Constitutional: She is oriented to person, place, and time. She appears well-developed.   HENT:   Head: Normocephalic and atraumatic.   Nose: Nose normal.   Eyes: Conjunctivae are normal. Right eye exhibits no discharge. Left eye exhibits no discharge.   Neck: Normal range of motion. Neck supple. No tracheal deviation present. No thyromegaly present.   Cardiovascular: " Normal rate and regular rhythm.    Pulmonary/Chest: Effort normal. No respiratory distress. She has no wheezes.   Abdominal: Soft. She exhibits no distension. There is no hepatosplenomegaly. There is no tenderness. There is no CVA tenderness. No hernia.   Genitourinary:   Genitourinary Comments: Patient declined exam   Musculoskeletal: Normal range of motion. She exhibits no edema.   Neurological: She is alert and oriented to person, place, and time.   Skin: Skin is warm and dry. No rash noted. No erythema.     Psychiatric: She has a normal mood and affect. Her behavior is normal. Judgment normal.       Urine dipstick shows ++LE, +Nitrite, trace protein, 1000 glucose, 50 RBCs.      Assessment:       1. Retained ureteral stent    2. Retroperitoneal fibrosis    3. Ureteral stricture          Plan:       1. Retained ureteral stent  -Stent exchange delayed >1 year once again  -Last stent exchange by Dr. Paula on 11/17/17. Plan for stent exchange q 6 months per Dr. Paula previously but patient lost to follow up  -Needs stent exchange ASAP and evaluation for strictures  -Plan for cysto with bilateral stent exchange/possible aaron RPG/possible aaron URS on Friday 7/19/19 with Dr. Paula  - POCT urinalysis, dipstick or tablet reag  - Urine culture    2. Retroperitoneal fibrosis  -?Diagnosis per Dr. Paula    3. Ureteral stricture  -Due to stones vs XRT            Follow up in about 8 weeks (around 8/27/2019) for Follow up.

## 2019-07-05 ENCOUNTER — TELEPHONE (OUTPATIENT)
Dept: UROLOGY | Facility: CLINIC | Age: 54
End: 2019-07-05

## 2019-07-05 LAB — BACTERIA UR CULT: ABNORMAL

## 2019-07-05 RX ORDER — SULFAMETHOXAZOLE AND TRIMETHOPRIM 800; 160 MG/1; MG/1
1 TABLET ORAL 2 TIMES DAILY
Qty: 20 TABLET | Refills: 0 | Status: SHIPPED | OUTPATIENT
Start: 2019-07-05 | End: 2019-07-15

## 2019-07-12 ENCOUNTER — HOSPITAL ENCOUNTER (OUTPATIENT)
Dept: PREADMISSION TESTING | Facility: HOSPITAL | Age: 54
Discharge: HOME | End: 2019-07-12
Attending: UROLOGY
Payer: COMMERCIAL

## 2019-07-12 ENCOUNTER — TELEPHONE (OUTPATIENT)
Dept: UROLOGY | Facility: CLINIC | Age: 54
End: 2019-07-12

## 2019-07-12 VITALS
OXYGEN SATURATION: 98 % | SYSTOLIC BLOOD PRESSURE: 121 MMHG | HEIGHT: 68 IN | DIASTOLIC BLOOD PRESSURE: 85 MMHG | BODY MASS INDEX: 25.23 KG/M2 | HEART RATE: 78 BPM | TEMPERATURE: 98 F | RESPIRATION RATE: 16 BRPM | WEIGHT: 166.44 LBS

## 2019-07-12 DIAGNOSIS — Z01.818 PRE-OP TESTING: Primary | ICD-10-CM

## 2019-07-12 LAB
ANION GAP SERPL CALC-SCNC: 9 MMOL/L (ref 8–16)
BASOPHILS # BLD AUTO: 0.02 K/UL (ref 0–0.2)
BASOPHILS NFR BLD: 0.3 % (ref 0–1.9)
BUN SERPL-MCNC: 19 MG/DL (ref 6–20)
CALCIUM SERPL-MCNC: 10.3 MG/DL (ref 8.7–10.5)
CHLORIDE SERPL-SCNC: 98 MMOL/L (ref 95–110)
CO2 SERPL-SCNC: 25 MMOL/L (ref 23–29)
CREAT SERPL-MCNC: 1.3 MG/DL (ref 0.5–1.4)
DIFFERENTIAL METHOD: ABNORMAL
EOSINOPHIL # BLD AUTO: 0.1 K/UL (ref 0–0.5)
EOSINOPHIL NFR BLD: 1.9 % (ref 0–8)
ERYTHROCYTE [DISTWIDTH] IN BLOOD BY AUTOMATED COUNT: 12.2 % (ref 11.5–14.5)
EST. GFR  (AFRICAN AMERICAN): 54 ML/MIN/1.73 M^2
EST. GFR  (NON AFRICAN AMERICAN): 47 ML/MIN/1.73 M^2
GLUCOSE SERPL-MCNC: 165 MG/DL (ref 70–110)
HCT VFR BLD AUTO: 38.9 % (ref 37–48.5)
HGB BLD-MCNC: 13.1 G/DL (ref 12–16)
LYMPHOCYTES # BLD AUTO: 2.7 K/UL (ref 1–4.8)
LYMPHOCYTES NFR BLD: 35.9 % (ref 18–48)
MCH RBC QN AUTO: 31.2 PG (ref 27–31)
MCHC RBC AUTO-ENTMCNC: 33.7 G/DL (ref 32–36)
MCV RBC AUTO: 93 FL (ref 82–98)
MONOCYTES # BLD AUTO: 0.6 K/UL (ref 0.3–1)
MONOCYTES NFR BLD: 8.3 % (ref 4–15)
NEUTROPHILS # BLD AUTO: 4 K/UL (ref 1.8–7.7)
NEUTROPHILS NFR BLD: 53.7 % (ref 38–73)
PLATELET # BLD AUTO: 394 K/UL (ref 150–350)
PMV BLD AUTO: 9 FL (ref 9.2–12.9)
POTASSIUM SERPL-SCNC: 4.8 MMOL/L (ref 3.5–5.1)
RBC # BLD AUTO: 4.2 M/UL (ref 4–5.4)
SODIUM SERPL-SCNC: 132 MMOL/L (ref 136–145)
WBC # BLD AUTO: 7.5 K/UL (ref 3.9–12.7)

## 2019-07-12 PROCEDURE — 93010 ELECTROCARDIOGRAM REPORT: CPT | Mod: ,,, | Performed by: INTERNAL MEDICINE

## 2019-07-12 PROCEDURE — 93010 EKG 12-LEAD: ICD-10-PCS | Mod: ,,, | Performed by: INTERNAL MEDICINE

## 2019-07-12 PROCEDURE — 36415 COLL VENOUS BLD VENIPUNCTURE: CPT

## 2019-07-12 PROCEDURE — 80048 BASIC METABOLIC PNL TOTAL CA: CPT

## 2019-07-12 PROCEDURE — 85025 COMPLETE CBC W/AUTO DIFF WBC: CPT

## 2019-07-12 PROCEDURE — 93005 ELECTROCARDIOGRAM TRACING: CPT

## 2019-07-12 RX ORDER — METFORMIN HYDROCHLORIDE 500 MG/1
500 TABLET ORAL 2 TIMES DAILY WITH MEALS
COMMUNITY

## 2019-07-12 NOTE — DISCHARGE INSTRUCTIONS
Your surgery is scheduled for _Friday July 19, 2019__.    Call [phone] between 2 p.m. and 5 p.m. on   __Thursday__ to find out your arrival time for the day of your surgery.      Please report to SAME DAY SURGERY UNIT on the 2nd FLOOR at _______ a.m.  Use front door entrance. The doors open at 0530 am.      INSTRUCTIONS IMPORTANT!!!  ¨ Do not eat or drink after 12 midnight-including water. OK to brush teeth, no   gum, candy or mints!      _x___  Prep instructions:    SHOWER     _x___  Please shower using Hibiclens soap the night before AND  the morning of your surgery/procedure. Do not use Hibiclens on your face or genitals               If your surgery is around your belly button (Navel) be sure to wash inside your belly button also. Rinse hibiclens off completely.  _x___  No shaving of procedural area at least 4-5 days before surgery due to  increased risk of skin irritation and/or possible infection.  _x___  Do not wear makeup, including mascara. WEARING EYE MAKEUP MAY LEAD TO SERIOUS EYE INJURY during surgery.  __x__  No powder, lotions or creams to your body.  __x__  You may wear only deodorant on the day of surgery.  _x___  Please remove all jewelry, including piercings and leave at home.  _x___  No money or valuables needed. Please leave at home.  You may bring your cell phone.    _x___  If going home the same day, arrange for a ride home. You will not be able to drive if Anesthesia was used.  _x___  Wear loose fitting clothing. Allow for dressings, bandages.  _x___  Stop Aspirin, Ibuprofen, Motrin and Aleve at least 3-5 days before  surgery, unless otherwise instructed by your doctor, or the nurse.              You MAY use Tylenol/acetaminophen until day of surgery.  _x___  If you take diabetic medication, do not take am of surgery   _x___  Call MD for temperature above 101 degrees.        _x___ Stop taking any Fish Oil supplement or any Vitamins that contain Vitamin  E at least 5 days prior to  surgery.              I have read or had read and explained to me, and understand the above information.  Additional comments or instructions:Please call   [phone] if you have any questions regarding the instructions above.

## 2019-07-12 NOTE — TELEPHONE ENCOUNTER
Spoke to pt she states while doing per-op today she was told she needed $1400.00 for procedure. Pt states she doesn't have money for this an is asking can procedure be postpone an if so for how long. Is this procedure medically necessary. Please advise-todd

## 2019-07-12 NOTE — TELEPHONE ENCOUNTER
Her procedure is absolutely medically necessary. She is already over one YEAR late for her stent exchange.     Please refer her to financial department for payment plan options.

## 2019-07-12 NOTE — TELEPHONE ENCOUNTER
Spoke to pt advised per  this procedure is medically necessary an patient registration was made aware of this. Patient will be heading back to do her pre-op-todd

## 2019-07-19 ENCOUNTER — HOSPITAL ENCOUNTER (OUTPATIENT)
Facility: HOSPITAL | Age: 54
Discharge: HOME | End: 2019-07-19
Attending: UROLOGY | Admitting: UROLOGY
Payer: COMMERCIAL

## 2019-07-19 ENCOUNTER — ANESTHESIA EVENT (OUTPATIENT)
Dept: SURGERY | Facility: HOSPITAL | Age: 54
End: 2019-07-19
Payer: COMMERCIAL

## 2019-07-19 ENCOUNTER — ANESTHESIA (OUTPATIENT)
Dept: SURGERY | Facility: HOSPITAL | Age: 54
End: 2019-07-19
Payer: COMMERCIAL

## 2019-07-19 VITALS
SYSTOLIC BLOOD PRESSURE: 120 MMHG | DIASTOLIC BLOOD PRESSURE: 80 MMHG | WEIGHT: 166 LBS | TEMPERATURE: 98 F | HEART RATE: 67 BPM | OXYGEN SATURATION: 99 % | BODY MASS INDEX: 25.16 KG/M2 | RESPIRATION RATE: 16 BRPM | HEIGHT: 68 IN

## 2019-07-19 DIAGNOSIS — N13.1 HYDRONEPHROSIS WITH URETERAL STRICTURE, NOT ELSEWHERE CLASSIFIED: ICD-10-CM

## 2019-07-19 DIAGNOSIS — N13.5 URETERAL STRICTURE: ICD-10-CM

## 2019-07-19 DIAGNOSIS — N13.5 URETER, STRICTURE: ICD-10-CM

## 2019-07-19 DIAGNOSIS — Z96.0 RETAINED URETERAL STENT: ICD-10-CM

## 2019-07-19 DIAGNOSIS — K68.2 RETROPERITONEAL FIBROSIS: Primary | ICD-10-CM

## 2019-07-19 LAB — POCT GLUCOSE: 193 MG/DL (ref 70–110)

## 2019-07-19 PROCEDURE — 74420 UROGRAPHY RTRGR +-KUB: CPT | Mod: 26,,, | Performed by: UROLOGY

## 2019-07-19 PROCEDURE — C2617 STENT, NON-COR, TEM W/O DEL: HCPCS | Performed by: UROLOGY

## 2019-07-19 PROCEDURE — 63600175 PHARM REV CODE 636 W HCPCS: Performed by: UROLOGY

## 2019-07-19 PROCEDURE — 25000003 PHARM REV CODE 250: Performed by: NURSE ANESTHETIST, CERTIFIED REGISTERED

## 2019-07-19 PROCEDURE — 25500020 PHARM REV CODE 255: Performed by: UROLOGY

## 2019-07-19 PROCEDURE — 88300 TISSUE SPECIMEN TO PATHOLOGY - SURGERY: ICD-10-PCS | Mod: 26,,, | Performed by: PATHOLOGY

## 2019-07-19 PROCEDURE — 52332 CYSTOSCOPY AND TREATMENT: CPT | Mod: 50,,, | Performed by: UROLOGY

## 2019-07-19 PROCEDURE — 36000706: Performed by: UROLOGY

## 2019-07-19 PROCEDURE — 88300 SURGICAL PATH GROSS: CPT | Performed by: PATHOLOGY

## 2019-07-19 PROCEDURE — 74420 PR  X-RAY RETROGRADE PYELOGRAM: ICD-10-PCS | Mod: 26,,, | Performed by: UROLOGY

## 2019-07-19 PROCEDURE — 63600175 PHARM REV CODE 636 W HCPCS: Performed by: NURSE ANESTHETIST, CERTIFIED REGISTERED

## 2019-07-19 PROCEDURE — 36000707: Performed by: UROLOGY

## 2019-07-19 PROCEDURE — 82962 GLUCOSE BLOOD TEST: CPT | Performed by: UROLOGY

## 2019-07-19 PROCEDURE — 71000016 HC POSTOP RECOV ADDL HR: Performed by: UROLOGY

## 2019-07-19 PROCEDURE — 71000033 HC RECOVERY, INTIAL HOUR: Performed by: UROLOGY

## 2019-07-19 PROCEDURE — 52332 PR CYSTOSCOPY,INSERT URETERAL STENT: ICD-10-PCS | Mod: 50,,, | Performed by: UROLOGY

## 2019-07-19 PROCEDURE — 25000003 PHARM REV CODE 250: Performed by: ANESTHESIOLOGY

## 2019-07-19 PROCEDURE — D9220A PRA ANESTHESIA: Mod: CRNA,,, | Performed by: NURSE ANESTHETIST, CERTIFIED REGISTERED

## 2019-07-19 PROCEDURE — D9220A PRA ANESTHESIA: ICD-10-PCS | Mod: ANES,,, | Performed by: ANESTHESIOLOGY

## 2019-07-19 PROCEDURE — 71000015 HC POSTOP RECOV 1ST HR: Performed by: UROLOGY

## 2019-07-19 PROCEDURE — 63600175 PHARM REV CODE 636 W HCPCS: Performed by: ANESTHESIOLOGY

## 2019-07-19 PROCEDURE — 37000008 HC ANESTHESIA 1ST 15 MINUTES: Performed by: UROLOGY

## 2019-07-19 PROCEDURE — D9220A PRA ANESTHESIA: ICD-10-PCS | Mod: CRNA,,, | Performed by: NURSE ANESTHETIST, CERTIFIED REGISTERED

## 2019-07-19 PROCEDURE — D9220A PRA ANESTHESIA: Mod: ANES,,, | Performed by: ANESTHESIOLOGY

## 2019-07-19 PROCEDURE — 37000009 HC ANESTHESIA EA ADD 15 MINS: Performed by: UROLOGY

## 2019-07-19 PROCEDURE — 88300 SURGICAL PATH GROSS: CPT | Mod: 26,,, | Performed by: PATHOLOGY

## 2019-07-19 PROCEDURE — 71000039 HC RECOVERY, EACH ADD'L HOUR: Performed by: UROLOGY

## 2019-07-19 PROCEDURE — C1758 CATHETER, URETERAL: HCPCS | Performed by: UROLOGY

## 2019-07-19 PROCEDURE — C1769 GUIDE WIRE: HCPCS | Performed by: UROLOGY

## 2019-07-19 DEVICE — STENT URET PERCUFLEX 6FR 24CM: Type: IMPLANTABLE DEVICE | Site: URETER | Status: FUNCTIONAL

## 2019-07-19 DEVICE — IMPLANTABLE DEVICE: Type: IMPLANTABLE DEVICE | Site: URETER | Status: FUNCTIONAL

## 2019-07-19 DEVICE — STENT URET PERCUFLEX 6FR 22CM: Type: IMPLANTABLE DEVICE | Site: URETER | Status: FUNCTIONAL

## 2019-07-19 RX ORDER — PROPOFOL 10 MG/ML
VIAL (ML) INTRAVENOUS
Status: DISCONTINUED | OUTPATIENT
Start: 2019-07-19 | End: 2019-07-19

## 2019-07-19 RX ORDER — CEPHALEXIN 500 MG/1
500 CAPSULE ORAL EVERY 12 HOURS
Qty: 4 CAPSULE | Refills: 0 | Status: SHIPPED | OUTPATIENT
Start: 2019-07-19 | End: 2019-08-27

## 2019-07-19 RX ORDER — LIDOCAINE HYDROCHLORIDE 10 MG/ML
1 INJECTION, SOLUTION EPIDURAL; INFILTRATION; INTRACAUDAL; PERINEURAL ONCE
Status: DISCONTINUED | OUTPATIENT
Start: 2019-07-19 | End: 2019-07-19 | Stop reason: HOSPADM

## 2019-07-19 RX ORDER — PHENYLEPHRINE HYDROCHLORIDE 10 MG/ML
INJECTION INTRAVENOUS
Status: DISCONTINUED | OUTPATIENT
Start: 2019-07-19 | End: 2019-07-19

## 2019-07-19 RX ORDER — PHENAZOPYRIDINE HYDROCHLORIDE 100 MG/1
200 TABLET, FILM COATED ORAL
Qty: 18 TABLET | Refills: 0 | Status: SHIPPED | OUTPATIENT
Start: 2019-07-19 | End: 2019-08-27

## 2019-07-19 RX ORDER — FENTANYL CITRATE 50 UG/ML
INJECTION, SOLUTION INTRAMUSCULAR; INTRAVENOUS
Status: DISCONTINUED | OUTPATIENT
Start: 2019-07-19 | End: 2019-07-19

## 2019-07-19 RX ORDER — LIDOCAINE HCL/PF 100 MG/5ML
SYRINGE (ML) INTRAVENOUS
Status: DISCONTINUED | OUTPATIENT
Start: 2019-07-19 | End: 2019-07-19

## 2019-07-19 RX ORDER — CEFAZOLIN SODIUM 2 G/50ML
2 SOLUTION INTRAVENOUS
Status: COMPLETED | OUTPATIENT
Start: 2019-07-19 | End: 2019-07-19

## 2019-07-19 RX ORDER — SODIUM CHLORIDE, SODIUM LACTATE, POTASSIUM CHLORIDE, CALCIUM CHLORIDE 600; 310; 30; 20 MG/100ML; MG/100ML; MG/100ML; MG/100ML
INJECTION, SOLUTION INTRAVENOUS CONTINUOUS
Status: DISCONTINUED | OUTPATIENT
Start: 2019-07-19 | End: 2019-07-19 | Stop reason: HOSPADM

## 2019-07-19 RX ORDER — HYDROCODONE BITARTRATE AND ACETAMINOPHEN 5; 325 MG/1; MG/1
1 TABLET ORAL EVERY 6 HOURS PRN
Qty: 5 TABLET | Refills: 0 | Status: SHIPPED | OUTPATIENT
Start: 2019-07-19 | End: 2019-08-27

## 2019-07-19 RX ORDER — METOCLOPRAMIDE HYDROCHLORIDE 5 MG/ML
INJECTION INTRAMUSCULAR; INTRAVENOUS
Status: DISCONTINUED | OUTPATIENT
Start: 2019-07-19 | End: 2019-07-19

## 2019-07-19 RX ORDER — GLYCOPYRROLATE 0.2 MG/ML
INJECTION INTRAMUSCULAR; INTRAVENOUS
Status: DISCONTINUED | OUTPATIENT
Start: 2019-07-19 | End: 2019-07-19

## 2019-07-19 RX ORDER — ONDANSETRON 2 MG/ML
4 INJECTION INTRAMUSCULAR; INTRAVENOUS EVERY 12 HOURS PRN
Status: DISCONTINUED | OUTPATIENT
Start: 2019-07-19 | End: 2019-07-19 | Stop reason: HOSPADM

## 2019-07-19 RX ORDER — SODIUM CHLORIDE 0.9 % (FLUSH) 0.9 %
10 SYRINGE (ML) INJECTION
Status: DISCONTINUED | OUTPATIENT
Start: 2019-07-19 | End: 2019-07-19 | Stop reason: HOSPADM

## 2019-07-19 RX ORDER — MIDAZOLAM HYDROCHLORIDE 1 MG/ML
INJECTION, SOLUTION INTRAMUSCULAR; INTRAVENOUS
Status: DISCONTINUED | OUTPATIENT
Start: 2019-07-19 | End: 2019-07-19

## 2019-07-19 RX ORDER — ACETAMINOPHEN 325 MG/1
650 TABLET ORAL EVERY 4 HOURS PRN
Status: DISCONTINUED | OUTPATIENT
Start: 2019-07-19 | End: 2019-07-19 | Stop reason: HOSPADM

## 2019-07-19 RX ORDER — HYDROCODONE BITARTRATE AND ACETAMINOPHEN 5; 325 MG/1; MG/1
1 TABLET ORAL EVERY 4 HOURS PRN
Status: DISCONTINUED | OUTPATIENT
Start: 2019-07-19 | End: 2019-07-19 | Stop reason: HOSPADM

## 2019-07-19 RX ORDER — HYDROMORPHONE HYDROCHLORIDE 2 MG/ML
0.2 INJECTION, SOLUTION INTRAMUSCULAR; INTRAVENOUS; SUBCUTANEOUS EVERY 5 MIN PRN
Status: DISCONTINUED | OUTPATIENT
Start: 2019-07-19 | End: 2019-07-19 | Stop reason: HOSPADM

## 2019-07-19 RX ADMIN — FENTANYL CITRATE 50 MCG: 50 INJECTION INTRAMUSCULAR; INTRAVENOUS at 09:07

## 2019-07-19 RX ADMIN — FENTANYL CITRATE 50 MCG: 50 INJECTION INTRAMUSCULAR; INTRAVENOUS at 10:07

## 2019-07-19 RX ADMIN — LIDOCAINE HYDROCHLORIDE 100 MG: 20 INJECTION, SOLUTION INTRAVENOUS at 09:07

## 2019-07-19 RX ADMIN — PHENYLEPHRINE HYDROCHLORIDE 200 MCG: 10 INJECTION INTRAVENOUS at 09:07

## 2019-07-19 RX ADMIN — SODIUM CHLORIDE, SODIUM LACTATE, POTASSIUM CHLORIDE, AND CALCIUM CHLORIDE: .6; .31; .03; .02 INJECTION, SOLUTION INTRAVENOUS at 10:07

## 2019-07-19 RX ADMIN — HYDROMORPHONE HYDROCHLORIDE 0.2 MG: 2 INJECTION, SOLUTION INTRAMUSCULAR; INTRAVENOUS; SUBCUTANEOUS at 11:07

## 2019-07-19 RX ADMIN — PROPOFOL 160 MG: 10 INJECTION, EMULSION INTRAVENOUS at 09:07

## 2019-07-19 RX ADMIN — MIDAZOLAM HYDROCHLORIDE 2 MG: 1 INJECTION, SOLUTION INTRAMUSCULAR; INTRAVENOUS at 09:07

## 2019-07-19 RX ADMIN — CEFAZOLIN SODIUM 2 G: 2 SOLUTION INTRAVENOUS at 09:07

## 2019-07-19 RX ADMIN — SODIUM CHLORIDE, SODIUM LACTATE, POTASSIUM CHLORIDE, AND CALCIUM CHLORIDE: .6; .31; .03; .02 INJECTION, SOLUTION INTRAVENOUS at 07:07

## 2019-07-19 RX ADMIN — GLYCOPYRROLATE 0.2 MG: 0.2 INJECTION, SOLUTION INTRAMUSCULAR; INTRAVENOUS at 09:07

## 2019-07-19 RX ADMIN — METOCLOPRAMIDE 10 MG: 5 INJECTION, SOLUTION INTRAMUSCULAR; INTRAVENOUS at 09:07

## 2019-07-19 NOTE — H&P
"Subjective:       Patient ID: Lizy Jimenez is a 53 y.o. female who is an established patient of Dr. Paula though new to me was last seen in this office 12/12/17     Chief Complaint:        Chief Complaint   Patient presents with    Other       Discuss procedure and follow up.. states she has been fine       Per Dr. Paula:  She was last seen 12/15 by [facility]. 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 Also with diagnosis of IC (given Elmiron by [facility]). She was managed with indwelling stents that were changes q3mths since 2005. Stents were changed by [facility] 11/15. After that, she established cared with Dr Smith at .      She has not been seen by this practice in almost 2 years. Last stent exchange by [facility] in 11/15 required R URS due to displaced stent (difficult to interpret op note). She reports last stent exchange by Dr Smith was in 7/16 - she is VERY overdue for stent exchange. She did not bring any records from Dr Smith.      In review of her notes, it is very difficult to understand the etiology/workup of her strictures/RPF. She is s/p XRT for cervical cancer. She states that the stents were in place prior to XRT and were done due to kidney stones. She was offered reimplantation but she did not want to do this due to down time from surgery.       Stent exchange (uncomplicated) on 11/17/17. R - 6Fr x 22cm. LUP - 6Fr x 22cm, LLP - 6Fr x 20cm     Treated with Macrobid for UTI in 12/2017 by Dr. Paula     7/2/19  She has not been seen in this practice for ~ 1.5 years. She is here today to schedule stent exchange. She reports occasional "stinging" with urination.  UA today concerning for UTI.  Denies gross hematuria, flank pain or fever     ACTIVE MEDICAL ISSUES:      Patient Active Problem List   Diagnosis    Ureteral stricture    Hydronephrosis    Stricture or kinking of ureter    Incomplete bladder emptying    Urinary " "tract infection (UTI)    Nocturia    Interstitial cystitis    Ureter, stricture    Retroperitoneal fibrosis    Gross hematuria    Ureteral stent displacement    Retained ureteral stent         ALLERGIES AND MEDICATIONS: updated and reviewed.  Review of patient's allergies indicates:  No Known Allergies       Current Outpatient Medications   Medication Sig    atorvastatin (LIPITOR) 20 MG tablet Take 20 mg by mouth once daily.    lisinopril 10 MG tablet Take 10 mg by mouth once daily.       No current facility-administered medications for this visit.          Review of Systems   Constitutional: Negative for activity change, chills, fatigue, fever and unexpected weight change.   Eyes: Negative for discharge, redness and visual disturbance.   Respiratory: Negative for cough, shortness of breath and wheezing.    Cardiovascular: Negative for chest pain and leg swelling.   Gastrointestinal: Negative for abdominal distention, abdominal pain, constipation, diarrhea, nausea and vomiting.   Genitourinary: Negative for decreased urine volume, difficulty urinating, flank pain, frequency, hematuria, pelvic pain, urgency, vaginal bleeding, vaginal discharge and vaginal pain.   Musculoskeletal: Negative for arthralgias, joint swelling and myalgias.   Skin: Negative for color change and rash.   Neurological: Negative for dizziness and light-headedness.   Psychiatric/Behavioral: Negative for behavioral problems and confusion. The patient is not nervous/anxious.        Objective:   Vitals       Vitals:     07/02/19 0955   BP: 104/60   Weight: 77.1 kg (170 lb)   Height: 5' 9" (1.753 m)         Physical Exam   Constitutional: She is oriented to person, place, and time. She appears well-developed.   HENT:   Head: Normocephalic and atraumatic.   Nose: Nose normal.   Eyes: Conjunctivae are normal. Right eye exhibits no discharge. Left eye exhibits no discharge.   Neck: Normal range of motion. Neck supple. No tracheal deviation " present. No thyromegaly present.   Cardiovascular: Normal rate and regular rhythm.    Pulmonary/Chest: Effort normal. No respiratory distress. She has no wheezes.   Abdominal: Soft. She exhibits no distension. There is no hepatosplenomegaly. There is no tenderness. There is no CVA tenderness. No hernia.   Genitourinary:   Genitourinary Comments: Patient declined exam   Musculoskeletal: Normal range of motion. She exhibits no edema.   Neurological: She is alert and oriented to person, place, and time.   Skin: Skin is warm and dry. No rash noted. No erythema.     Psychiatric: She has a normal mood and affect. Her behavior is normal. Judgment normal.       Urine dipstick shows ++LE, +Nitrite, trace protein, 1000 glucose, 50 RBCs.       Assessment:       1. Retained ureteral stent    2. Retroperitoneal fibrosis    3. Ureteral stricture           Plan:       1. Retained ureteral stent  -Stent exchange delayed >1 year once again  -Last stent exchange by Dr. Paula on 11/17/17. Plan for stent exchange q 6 months per Dr. Paula previously but patient lost to follow up  -Needs stent exchange ASAP and evaluation for strictures  -Plan for cysto with bilateral stent exchange/possible aaron RPG/possible aaron URS on Friday 7/19/19 with Dr. Paula  - POCT urinalysis, dipstick or tablet reag  - Urine culture     2. Retroperitoneal fibrosis  -?Diagnosis per Dr. Paula     3. Ureteral stricture  -Due to stones vs XRT               Follow up in about 8 weeks (around 8/27/2019) for Follow up.

## 2019-07-19 NOTE — ANESTHESIA PREPROCEDURE EVALUATION
07/19/2019  Lizy Jimenez is a 53 y.o., female.    Anesthesia Evaluation         Review of Systems  Cardiovascular:   Hypertension   Renal/:   Chronic Renal Disease        Physical Exam  General:  Well nourished    Airway/Jaw/Neck:  Airway Findings: Mallampati: II TM Distance: 4 - 6 cm      Dental:  DENTAL FINDINGS: Normal   Chest/Lungs:  Chest/Lungs Clear    Heart/Vascular:  Heart Findings: Normal       Mental Status:  Mental Status Findings:  Cooperative, Alert and Oriented         Anesthesia Plan  Type of Anesthesia, risks & benefits discussed:  Anesthesia Type:  general  Patient's Preference:   Intra-op Monitoring Plan: standard ASA monitors  Intra-op Monitoring Plan Comments:   Post Op Pain Control Plan: multimodal analgesia, IV/PO Opioids PRN and per primary service following discharge from PACU  Post Op Pain Control Plan Comments:   Induction:    Beta Blocker:  Patient is not currently on a Beta-Blocker (No further documentation required).       Informed Consent: Patient understands risks and agrees with Anesthesia plan.  Questions answered. Anesthesia consent signed with patient.  ASA Score: 2     Day of Surgery Review of History & Physical:    H&P update referred to the provider.  H&P completed by Anesthesiologist.   Anesthesia Plan Notes: npo        Ready For Surgery From Anesthesia Perspective.

## 2019-07-19 NOTE — OP NOTE
[facility]  Surgery Department  Urology Operative Note    SUMMARY     Date of Procedure: 7/19/2019     Surgeon(s) and Role:     * Dena Paula MD - Primary    Assisting Surgeon: None    Pre-Operative Diagnosis: Retained ureteral stent [Z96.0]  Retroperitoneal fibrosis [N13.5]  Ureteral stricture [N13.5]  Hydronephrosis with ureteral stricture, not elsewhere classified [N13.1]    Post-Operative Diagnosis: Post-Op Diagnosis Codes:     * Retained ureteral stent [Z96.0]     * Retroperitoneal fibrosis [N13.5]     * Ureteral stricture [N13.5]     * Hydronephrosis with ureteral stricture, not elsewhere classified [N13.1]    Procedure: Procedure(s) (LRB):  Cystoscopy  Exchange of bilateral ureteral stents, 2 on left  Bilateral retrograde pyelogram    Anesthesia: Choice    Indication for Procedure: 52yo F with known retroperitoneal fibrosis/ureteral strictures.  She has been managed with indwelling ureteral stents.  She has been lost to followup multiple times.  She presents now after extended period of retained stents.  Her last stent exchange was 11/17/2017.  She presents today for stent exchange.  She understands possible complications due to her retained stents.  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    Description of Procedure: The patient was brought to operating room and placed under general anesthesia. Full time out procedures were performed identifying correct patient, procedure and laterality. Appropriate antibiotics with Ancef were given prior to commencement of surgery. The patient was placed in dorsal lithotomy position and prepped and draped in the usual sterile fashion.    A 22Fr rigid cystoscopy was placed per urethral and passed into the bladder. No urethral strictures were noted. Cystoscopy did not reveal any abnormality of the bladder other than previously placed stents.  film was obtained showing bilateral ureteral stents, two on left.  The  stent in the upper pole moiety did not have a good curl of the proximal portion.  Decision was made to perform place a longer stent into the upper pole on the left.      The stents were examined and were noted to be dark in nature, but had no stone encrustation.  First the right side was addressed and a sensor wire was passed alongside the stent and up to the level of the kidney.  Once the wire was in place, the stent was grasped and removed in its entirety.  The stent was able to be removed with minimal to no resistance.  Next, a dual lumen exchange catheter was passed over the wire and a retrograde pyelogram was performed with full strength Omnipaque solution.  There was noted to be narrow distal ureter and moderate hydroureteronephrosis proximal to this.  She was noted to have a partially duplicated system, but one ureter down from the proximal ureter down to the bladder.  Next, a cystoscope was replaced back into the bladder over the wire.  A 6-Palestinian x 22 cm double-J ureteral stent with no string was then passed over the wire and up to the level of the kidney.  The wire was removed and good curls noted in the proximal and distal portion of the stent.    Similar procedure was then performed on the patient's left side.  Initially, the upper pole/medial ureteral orifice was addressed and the wire was attempted to be passed alongside the stent and up to the level of the kidney.  The wire would not advance.  For this reason, the stent was grasped with flexible graspers and brought out to the urethral meatus.  The wire was able to be passed through the lumen of the stent and up to the level of the upper pole of the left kidney. The stent was then removed.  A retrograde pyelogram was performed with a dual-lumen exchange catheter that showed with narrowed distal ureter and moderate upper pole hydronephrosis. Cystoscope was replaced back into the bladder and a 6 Palestinian by 24 cm double-J ureteral stent was passed over  the wire into the upper pole moiety.       A similar procedure was performed for the lower pole moiety where stent was grasped and brought to the urethral meatus.  A wire was passed the lumen of the stent up to level of the lower pole moiety. Stent removed. There was no resistance in removal of the stent.  Retrograde pyelogram was performed showing moderate proximal hydro nephrosis of the lower pole moiety. The cystoscope was replaced back into the bladder and a 6-Turkish x 20 cm JJ stent was then passed over the   wire and up to the level of the kidney.  The wire was removed and coil was noted in the proximal portion and distal portion of the stent.     At the conclusion of the procedure, all 3 stents were in the proper position.  There was minimal to no hematuria noted from the ureters. The bladder was then drained and the cystoscope was removed.  The patient was awakened from general anesthesia and transferred to the PACU in stable   condition.    Findings:  No encrustation noted on retained stents.  Stents exchanged without issue.  Two stents placed on left incomplete duplication of collecting system.      Complications: No    Estimated Blood Loss (EBL): <5cc    Drains:   R - 6Fr x 22cm JJ stent.   L UP - 6Fr x 24cm JJ stent.   L LP - 6Fr x 20cm JJ stent.            Implants:   Implant Name Type Inv. Item Serial No.  Lot No. LRB No. Used   STENT URET PERCUFLEX 6FR 22CM - [id number]  STENT URET PERCUFLEX 6FR 22CM  [employer] [id number] Left 1   STENT URET PERCUFLEX 6FR 24CM - [id number]  STENT URET PERCUFLEX 6FR 24CM  [employer] [id number] Left 1   STENT URET PERCUFLEX 6FR 20CM - [id number]  STENT URET PERCUFLEX 6FR 20CM  [employer] [id number] Left 1       Specimens:   Specimen (12h ago, onward)    Start     Ordered    07/19/19 1011  Specimen to Pathology - Surgery  Once     Comments:  Pre-op Diagnosis: Retained ureteral stent [Z96.0]Retroperitoneal fibrosis [N13.5]Ureteral stricture  [N13.5]Hydronephrosis with ureteral stricture, not elsewhere classified [N13.1]Procedure(s):REPLACEMENT, STENT; cystoscopy, retrograde pyelogram Number of specimens: 1Name of specimens: 3 ureteral stents     Start Status     07/19/19 1011 Collected (07/19/19 1009) Order ID: [id number]       07/19/19 1010                  Condition: Good    Disposition: PACU - hemodynamically stable.    Attestation: I was present and scrubbed for the entire procedure.    Discharge Note    SUMMARY     Admit Date: 7/19/2019    Discharge Date and Time:  07/19/2019 10:30 AM    Hospital Course (synopsis of major diagnoses, care, treatment, and services provided during the course of the hospital stay): Uncomplicated cysto/stent placement.      Final Diagnosis: Post-Op Diagnosis Codes:     * Retained ureteral stent [Z96.0]     * Retroperitoneal fibrosis [N13.5]     * Ureteral stricture [N13.5]     * Hydronephrosis with ureteral stricture, not elsewhere classified [N13.1]    Disposition: Home or Self Care    Follow Up/Patient Instructions:     Medications:  Reconciled Home Medications:      Medication List      START taking these medications    cephALEXin 500 MG capsule  Commonly known as:  KEFLEX  Take 1 capsule (500 mg total) by mouth every 12 (twelve) hours.     HYDROcodone-acetaminophen 5-325 mg per tablet  Commonly known as:  NORCO  Take 1 tablet by mouth every 6 (six) hours as needed for Pain.     phenazopyridine 100 MG tablet  Commonly known as:  PYRIDIUM  Take 2 tablets (200 mg total) by mouth 3 (three) times daily with meals.        CONTINUE taking these medications    atorvastatin 20 MG tablet  Commonly known as:  LIPITOR  Take 20 mg by mouth once daily.     lisinopril 10 MG tablet  Take 10 mg by mouth once daily.     metFORMIN 500 MG tablet  Commonly known as:  GLUCOPHAGE  Take 500 mg by mouth daily with breakfast.          Discharge Procedure Orders   Diet general     Call MD for:  temperature >100.4     Call MD for:  persistent  nausea and vomiting     Call MD for:  severe uncontrolled pain     Call MD for:  difficulty breathing, headache or visual disturbances     No dressing needed     Activity as tolerated     Follow-up Information     Dena Paula MD In 6 weeks.    Specialty:  Urology  Why:  For post-op follow up  Contact information:  [address]  [address]  [facility] [address] [phone]

## 2019-07-19 NOTE — TRANSFER OF CARE
"Anesthesia Transfer of Care Note    Patient: Lizy Jimenez    Procedure(s) Performed: Procedure(s) (LRB):  REPLACEMENT, STENT; cystoscopy, retrograde pyelogram (Bilateral)    Patient location: PACU    Anesthesia Type: general    Transport from OR: Transported from OR on room air with adequate spontaneous ventilation    Post pain: adequate analgesia    Post assessment: no apparent anesthetic complications and tolerated procedure well    Post vital signs: stable    Level of consciousness: awake and responds to stimulation    Nausea/Vomiting: no nausea/vomiting    Complications: none    Transfer of care protocol was followed      Last vitals:   Visit Vitals  /78 (BP Location: Right arm, Patient Position: Lying)   Pulse 83   Temp 36.5 °C (97.7 °F) (Oral)   Resp 10   Ht 5' 8" (1.727 m)   Wt 75.3 kg (166 lb)   SpO2 100%   Breastfeeding? No   BMI 25.24 kg/m²     "

## 2019-07-19 NOTE — ANESTHESIA POSTPROCEDURE EVALUATION
Anesthesia Post Evaluation    Patient: Lizy Jimenez    Procedure(s) Performed: Procedure(s) (LRB):  REPLACEMENT, STENT; cystoscopy, retrograde pyelogram (Bilateral)    Final Anesthesia Type: general  Patient location during evaluation: PACU  Patient participation: Yes- Able to Participate  Level of consciousness: awake and alert, oriented and awake  Post-procedure vital signs: reviewed and stable  Pain management: adequate  Airway patency: patent  PONV status at discharge: No PONV  Anesthetic complications: no      Cardiovascular status: blood pressure returned to baseline, hemodynamically stable and stable  Respiratory status: unassisted and spontaneous ventilation  Hydration status: euvolemic  Follow-up not needed.          Vitals Value Taken Time   /72 7/19/2019 10:55 AM   Temp 36.5 °C (97.7 °F) 7/19/2019 10:29 AM   Pulse 74 7/19/2019 10:55 AM   Resp 10 7/19/2019 10:55 AM   SpO2 99 % 7/19/2019 10:55 AM   Vitals shown include unvalidated device data.      No case tracking events are documented in the log.      Pain/Nick Score: Pain Rating Prior to Med Admin: 0 (7/19/2019 10:27 AM)  Nick Score: 8 (7/19/2019 10:27 AM)

## 2019-08-27 ENCOUNTER — OFFICE VISIT (OUTPATIENT)
Dept: UROLOGY | Facility: CLINIC | Age: 54
End: 2019-08-27
Payer: COMMERCIAL

## 2019-08-27 VITALS
HEIGHT: 68 IN | RESPIRATION RATE: 16 BRPM | SYSTOLIC BLOOD PRESSURE: 110 MMHG | BODY MASS INDEX: 25.46 KG/M2 | WEIGHT: 168 LBS | DIASTOLIC BLOOD PRESSURE: 60 MMHG | HEART RATE: 68 BPM

## 2019-08-27 DIAGNOSIS — N30.10 INTERSTITIAL CYSTITIS: ICD-10-CM

## 2019-08-27 DIAGNOSIS — K68.2 RETROPERITONEAL FIBROSIS: ICD-10-CM

## 2019-08-27 DIAGNOSIS — N13.1 HYDRONEPHROSIS WITH URETERAL STRICTURE, NOT ELSEWHERE CLASSIFIED: ICD-10-CM

## 2019-08-27 DIAGNOSIS — N13.5 URETERAL STRICTURE: ICD-10-CM

## 2019-08-27 DIAGNOSIS — Z96.0 RETAINED URETERAL STENT: Primary | ICD-10-CM

## 2019-08-27 PROCEDURE — 99214 OFFICE O/P EST MOD 30 MIN: CPT | Mod: S$GLB,,, | Performed by: UROLOGY

## 2019-08-27 PROCEDURE — 3008F PR BODY MASS INDEX (BMI) DOCUMENTED: ICD-10-PCS | Mod: CPTII,S$GLB,, | Performed by: UROLOGY

## 2019-08-27 PROCEDURE — 99999 PR PBB SHADOW E&M-EST. PATIENT-LVL III: CPT | Mod: PBBFAC,,, | Performed by: UROLOGY

## 2019-08-27 PROCEDURE — 99214 PR OFFICE/OUTPT VISIT, EST, LEVL IV, 30-39 MIN: ICD-10-PCS | Mod: S$GLB,,, | Performed by: UROLOGY

## 2019-08-27 PROCEDURE — 3008F BODY MASS INDEX DOCD: CPT | Mod: CPTII,S$GLB,, | Performed by: UROLOGY

## 2019-08-27 PROCEDURE — 87077 CULTURE AEROBIC IDENTIFY: CPT

## 2019-08-27 PROCEDURE — 87186 SC STD MICRODIL/AGAR DIL: CPT

## 2019-08-27 PROCEDURE — 87088 URINE BACTERIA CULTURE: CPT

## 2019-08-27 PROCEDURE — 87086 URINE CULTURE/COLONY COUNT: CPT

## 2019-08-27 PROCEDURE — 99999 PR PBB SHADOW E&M-EST. PATIENT-LVL III: ICD-10-PCS | Mod: PBBFAC,,, | Performed by: UROLOGY

## 2019-08-27 RX ORDER — BLOOD-GLUCOSE METER
EACH MISCELLANEOUS
Refills: 0 | COMMUNITY
Start: 2019-06-24 | End: 2020-08-10 | Stop reason: CLARIF

## 2019-08-27 RX ORDER — LANCETS 33 GAUGE
EACH MISCELLANEOUS
Refills: 5 | COMMUNITY
Start: 2019-06-24

## 2019-08-27 RX ORDER — CALCIUM CITRATE/VITAMIN D3 200MG-6.25
TABLET ORAL
Refills: 5 | COMMUNITY
Start: 2019-06-24

## 2019-08-27 RX ORDER — POLYETHYLENE GLYCOL 3350 17 G/17G
1 POWDER, FOR SOLUTION ORAL
COMMUNITY
Start: 2015-04-13 | End: 2020-08-10 | Stop reason: CLARIF

## 2019-08-27 NOTE — PROGRESS NOTES
Subjective:       Lizy Jimenez is a 53 y.o. female who is an established patient of Dr. Zaragoza was seen for evaluation of RPF.      She was last seen 12/15 by [facility]. 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 Also with diagnosis of IC (given Elmiron by [facility]). She was managed with indwelling stents that were changes q3mths since 2005. Stents were changed by [facility] 11/15. After that, she established cared with Dr Smith at .     She has not been seen by this practice in almost 2 years. Last stent exchange by [facility] in 11/15 required R URS due to displaced stent (difficult to interpret op note). She reports last stent exchange by Dr Smith was in 7/16 - she is VERY overdue for stent exchange. She did not bring any records from Dr Smith.     In review of her notes, it is very difficult to understand the etiology/workup of her strictures/RPF. She is s/p XRT for cervical cancer. She states that the stents were in place prior to XRT and were done due to kidney stones. She was offered reimplantation but she did not want to do this due to down time from surgery.      Stent exchange (uncomplicated) on 11/17/17. R - 6Fr x 22cm. LUP - 6Fr x 22cm, LLP - 6Fr x 20cm     8/27/2019  She was lost to follow up again and eventually had stents exchanged 7/19/19 without much difficulty. Prior stent exchange was 11/17. Complete L ureteral duplication.       The following portions of the patient's history were reviewed and updated as appropriate: allergies, current medications, past family history, past medical history, past social history, past surgical history and problem list.    Review of Systems  Constitutional: no fever or chills  ENT: no nasal congestion or sore throat  Respiratory: no cough or shortness of breath  Cardiovascular: no chest pain or palpitations  Gastrointestinal: no nausea or vomiting, tolerating diet  Genitourinary: as per  "HPI  Hematologic/Lymphatic: no easy bruising or lymphadenopathy  Musculoskeletal: no arthralgias or myalgias  Skin: no rashes or lesions  Neurological: no seizures or tremors  Behavioral/Psych: no auditory or visual hallucinations        Objective:    Vitals: /60   Pulse 68   Resp 16   Ht 5' 8" (1.727 m)   Wt 76.2 kg (167 lb 15.9 oz)   BMI 25.54 kg/m²     Physical Exam   General: well developed, well nourished in no acute distress  Head: normocephalic, atraumatic  Neck: supple, trachea midline, no obvious enlargement of thyroid  HEENT: EOMI, mucus membranes moist, sclera anicteric, no hearing impairment  Lungs: symmetric expansion, non-labored breathing  Cardiovascular: regular rate and rhythm, normal pulses  Abdomen: soft, non tender, non distended, no palpable masses, no hepatosplenomegaly, no hernias, no CVA tenderness  Musculoskeletal: no peripheral edema, normal ROM in bilateral upper and lower extremities  Lymphatics: no cervical or inguinal lymphadenopathy  Skin: no rashes or lesions  Neuro: alert and oriented x 3, no gross deficits  Psych: normal judgment and insight, normal mood/affect and non-anxious  Genitourinary:   patient declined exam      Lab Review   Urine analysis today in clinic shows positive for nitrites, leukocytes, red blood cells 50    Lab Results   Component Value Date    WBC 7.50 07/12/2019    HGB 13.1 07/12/2019    HCT 38.9 07/12/2019    MCV 93 07/12/2019     (H) 07/12/2019     Lab Results   Component Value Date    CREATININE 1.3 07/12/2019    BUN 19 07/12/2019       Imaging  Images and reports were personally reviewed by me and discussed with patient         Assessment/Plan:      1. Retained ureteral stent    - Stent exchange delayed >1 year   - Stents exchanged by myself on 11/17/17 without complication despite prolonged indwelling time. Minimal encrustation. Similar procedure done 7/19 with prolonged indwelling time.    - Okay to plan for stent exchange in 6 months   - " Discussed referral to Dr Santacruz for definitive repair   - Complete duplication on L     2. Retroperitoneal fibrosis    - Unsure how this was diagnosed     3. Ureteral stricture    - Due to stones or XRT - unsure at this time     4. Interstitial cystitis    - Unsure diagnosis   - Was on Elmiron     UCx today due to dysuria      Follow up in 5 months to arrange for stent exchange

## 2019-08-29 ENCOUNTER — TELEPHONE (OUTPATIENT)
Dept: UROLOGY | Facility: CLINIC | Age: 54
End: 2019-08-29

## 2019-08-29 LAB — BACTERIA UR CULT: ABNORMAL

## 2019-08-29 RX ORDER — SULFAMETHOXAZOLE AND TRIMETHOPRIM 400; 80 MG/1; MG/1
1 TABLET ORAL 2 TIMES DAILY
Qty: 14 TABLET | Refills: 0 | Status: SHIPPED | OUTPATIENT
Start: 2019-08-29 | End: 2019-09-05

## 2020-07-15 ENCOUNTER — TELEPHONE (OUTPATIENT)
Dept: UROLOGY | Facility: CLINIC | Age: 55
End: 2020-07-15

## 2020-07-15 NOTE — TELEPHONE ENCOUNTER
----- Message from Carlos Castellon sent at 7/15/2020  3:25 PM CDT -----  Name of Who is Calling: pt    What is the request in detail: is needing to be schedule for a follow up visit. Epic did not have any availability. Please contact to further discuss and advise      Can the clinic reply by [facility]: no    What Number to Call Back if not in [facility]: [phone]

## 2020-07-27 ENCOUNTER — OFFICE VISIT (OUTPATIENT)
Dept: UROLOGY | Facility: CLINIC | Age: 55
End: 2020-07-27
Payer: MEDICAID

## 2020-07-27 VITALS — WEIGHT: 154.63 LBS | HEIGHT: 68 IN | BODY MASS INDEX: 23.44 KG/M2

## 2020-07-27 DIAGNOSIS — N13.5 URETERAL STRICTURE: ICD-10-CM

## 2020-07-27 DIAGNOSIS — Z96.0 RETAINED URETERAL STENT: Primary | ICD-10-CM

## 2020-07-27 DIAGNOSIS — R39.89 SUSPECTED UTI: ICD-10-CM

## 2020-07-27 DIAGNOSIS — K68.2 RETROPERITONEAL FIBROSIS: ICD-10-CM

## 2020-07-27 DIAGNOSIS — N30.10 INTERSTITIAL CYSTITIS: ICD-10-CM

## 2020-07-27 PROCEDURE — 99214 PR OFFICE/OUTPT VISIT, EST, LEVL IV, 30-39 MIN: ICD-10-PCS | Mod: S$PBB,,, | Performed by: UROLOGY

## 2020-07-27 PROCEDURE — 99214 OFFICE O/P EST MOD 30 MIN: CPT | Mod: PBBFAC | Performed by: UROLOGY

## 2020-07-27 PROCEDURE — 87088 URINE BACTERIA CULTURE: CPT

## 2020-07-27 PROCEDURE — 99214 OFFICE O/P EST MOD 30 MIN: CPT | Mod: S$PBB,,, | Performed by: UROLOGY

## 2020-07-27 PROCEDURE — 87086 URINE CULTURE/COLONY COUNT: CPT

## 2020-07-27 PROCEDURE — 99999 PR PBB SHADOW E&M-EST. PATIENT-LVL IV: CPT | Mod: PBBFAC,,, | Performed by: UROLOGY

## 2020-07-27 PROCEDURE — 99999 PR PBB SHADOW E&M-EST. PATIENT-LVL IV: ICD-10-PCS | Mod: PBBFAC,,, | Performed by: UROLOGY

## 2020-07-27 RX ORDER — SULFAMETHOXAZOLE AND TRIMETHOPRIM 800; 160 MG/1; MG/1
1 TABLET ORAL 2 TIMES DAILY
Qty: 14 TABLET | Refills: 0 | Status: SHIPPED | OUTPATIENT
Start: 2020-07-27 | End: 2020-08-03

## 2020-07-27 NOTE — H&P (VIEW-ONLY)
Subjective:       Lizy Jimenez is a 54 y.o. female who is an established patient of Dr. Zaragoza was seen for evaluation of RPF.      She was last seen 12/15 by [facility]. 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 Also with diagnosis of IC (given Elmiron by [facility]). She was managed with indwelling stents that were changes q3mths since 2005. Stents were changed by [facility] 11/15. After that, she established cared with Dr Smith at .     She has not been seen by this practice in almost 2 years. Last stent exchange by [facility] in 11/15 required R URS due to displaced stent (difficult to interpret op note). She reports last stent exchange by Dr Smith was in 7/16 - she is VERY overdue for stent exchange. She did not bring any records from Dr Smith.     In review of her notes, it is very difficult to understand the etiology/workup of her strictures/RPF. She is s/p XRT for cervical cancer. She states that the stents were in place prior to XRT and were done due to kidney stones. She was offered reimplantation but she did not want to do this due to down time from surgery.      Stent exchange (uncomplicated) on 11/17/17. R - 6Fr x 22cm. LUP - 6Fr x 22cm, LLP - 6Fr x 20cm     8/27/2019  She was lost to follow up again and eventually had stents exchanged 7/19/19 without much difficulty. Prior stent exchange was 11/17. Complete L ureteral duplication.     7/27/2020  Again lost to follow up for stent exchange. One year since last exchange (7/19/19). Now with UTI symptoms. Two stents on L due to duplication.    R - 6Fr x 22cm JJ stent.   L UP - 6Fr x 24cm JJ stent.   L LP - 6Fr x 20cm JJ stent.       The following portions of the patient's history were reviewed and updated as appropriate: allergies, current medications, past family history, past medical history, past social history, past surgical history and problem list.    Review of  "Systems  Constitutional: no fever or chills  ENT: no nasal congestion or sore throat  Respiratory: no cough or shortness of breath  Cardiovascular: no chest pain or palpitations  Gastrointestinal: no nausea or vomiting, tolerating diet  Genitourinary: as per HPI  Hematologic/Lymphatic: no easy bruising or lymphadenopathy  Musculoskeletal: no arthralgias or myalgias  Skin: no rashes or lesions  Neurological: no seizures or tremors  Behavioral/Psych: no auditory or visual hallucinations        Objective:    Vitals: Ht 5' 8" (1.727 m)   Wt 70.1 kg (154 lb 10.4 oz)   BMI 23.51 kg/m²     Physical Exam   General: well developed, well nourished in no acute distress  Head: normocephalic, atraumatic  Neck: supple, trachea midline, no obvious enlargement of thyroid  HEENT: EOMI, mucus membranes moist, sclera anicteric, no hearing impairment  Lungs: symmetric expansion, non-labored breathing  Cardiovascular: regular rate and rhythm, normal pulses  Abdomen: soft, non tender, non distended, no palpable masses, no hepatosplenomegaly, no hernias, no CVA tenderness  Musculoskeletal: no peripheral edema, normal ROM in bilateral upper and lower extremities  Lymphatics: no cervical or inguinal lymphadenopathy  Skin: no rashes or lesions  Neuro: alert and oriented x 3, no gross deficits  Psych: normal judgment and insight, normal mood/affect and non-anxious  Genitourinary:   patient declined exam      Lab Review   Urine analysis today in clinic shows positive for nitrites, leukocytes, red blood cells 50    Lab Results   Component Value Date    WBC 7.50 07/12/2019    HGB 13.1 07/12/2019    HCT 38.9 07/12/2019    MCV 93 07/12/2019     (H) 07/12/2019     Lab Results   Component Value Date    CREATININE 1.3 07/12/2019    BUN 19 07/12/2019       Imaging  Images and reports were personally reviewed by me and discussed with patient         Assessment/Plan:      1. Retained ureteral stent    - Stent exchange delayed >1 year   - Stents " exchanged by myself on 11/17/17 without complication despite prolonged indwelling time. Minimal encrustation. Similar procedure done 7/19 with prolonged indwelling time.    - Discussed referral to Dr Santacruz for definitive repair   - Complete duplication on L   - Again overdue for stent exchange - will plan for exchange 8/14/20     2. Retroperitoneal fibrosis    - Unsure how this was diagnosed     3. Ureteral stricture    - Due to stones or XRT - unsure at this time     4. Interstitial cystitis    - Unsure diagnosis   - Was on Elmiron     5. Dysuria   - UCx now   - Bactrim empiric   - Needs stents exchanged        Follow up in 3 months

## 2020-07-29 ENCOUNTER — TELEPHONE (OUTPATIENT)
Dept: UROLOGY | Facility: CLINIC | Age: 55
End: 2020-07-29

## 2020-07-29 LAB — BACTERIA UR CULT: NORMAL

## 2020-07-29 NOTE — TELEPHONE ENCOUNTER
----- Message from Jil Goldstein sent at 7/29/2020 11:18 AM CDT -----  Type: Patient Call Back       What is the request in detail:  pt returning call      Can the clinic reply by [facility]? No       Would the patient rather a call back or a response via [facility]? Call back       Best call back number: [phone]

## 2020-07-29 NOTE — TELEPHONE ENCOUNTER
Please inform pt:    UCx did now show UTI. Recommend to continue abx given due to upcoming procedure.

## 2020-07-29 NOTE — TELEPHONE ENCOUNTER
Call returned to patient, patient advised that her UCX was negative but she should continue taking the prescribed ABx do to her upcoming appt. Patient verbalized understanding

## 2020-08-10 ENCOUNTER — ANESTHESIA EVENT (OUTPATIENT)
Dept: SURGERY | Facility: HOSPITAL | Age: 55
End: 2020-08-10
Payer: MEDICAID

## 2020-08-10 ENCOUNTER — HOSPITAL ENCOUNTER (OUTPATIENT)
Dept: PREADMISSION TESTING | Facility: HOSPITAL | Age: 55
Discharge: HOME | End: 2020-08-10
Attending: UROLOGY
Payer: MEDICAID

## 2020-08-10 VITALS
HEART RATE: 66 BPM | HEIGHT: 69 IN | OXYGEN SATURATION: 99 % | DIASTOLIC BLOOD PRESSURE: 67 MMHG | RESPIRATION RATE: 18 BRPM | WEIGHT: 152.75 LBS | SYSTOLIC BLOOD PRESSURE: 98 MMHG | TEMPERATURE: 98 F | BODY MASS INDEX: 22.63 KG/M2

## 2020-08-10 DIAGNOSIS — Z01.818 PREOP TESTING: Primary | ICD-10-CM

## 2020-08-10 LAB
ALBUMIN SERPL BCP-MCNC: 3.9 G/DL (ref 3.5–5.2)
ALP SERPL-CCNC: 68 U/L (ref 55–135)
ALT SERPL W/O P-5'-P-CCNC: 11 U/L (ref 10–44)
ANION GAP SERPL CALC-SCNC: 5 MMOL/L (ref 8–16)
AST SERPL-CCNC: 15 U/L (ref 10–40)
BASOPHILS # BLD AUTO: 0.04 K/UL (ref 0–0.2)
BASOPHILS NFR BLD: 0.6 % (ref 0–1.9)
BILIRUB SERPL-MCNC: 0.4 MG/DL (ref 0.1–1)
BUN SERPL-MCNC: 13 MG/DL (ref 6–20)
CALCIUM SERPL-MCNC: 9.4 MG/DL (ref 8.7–10.5)
CHLORIDE SERPL-SCNC: 104 MMOL/L (ref 95–110)
CO2 SERPL-SCNC: 30 MMOL/L (ref 23–29)
CREAT SERPL-MCNC: 1 MG/DL (ref 0.5–1.4)
DIFFERENTIAL METHOD: ABNORMAL
EOSINOPHIL # BLD AUTO: 0.1 K/UL (ref 0–0.5)
EOSINOPHIL NFR BLD: 1.7 % (ref 0–8)
ERYTHROCYTE [DISTWIDTH] IN BLOOD BY AUTOMATED COUNT: 11.2 % (ref 11.5–14.5)
EST. GFR  (AFRICAN AMERICAN): >60 ML/MIN/1.73 M^2
EST. GFR  (NON AFRICAN AMERICAN): >60 ML/MIN/1.73 M^2
GLUCOSE SERPL-MCNC: 138 MG/DL (ref 70–110)
HCT VFR BLD AUTO: 33.9 % (ref 37–48.5)
HGB BLD-MCNC: 11.4 G/DL (ref 12–16)
IMM GRANULOCYTES # BLD AUTO: 0.01 K/UL (ref 0–0.04)
IMM GRANULOCYTES NFR BLD AUTO: 0.1 % (ref 0–0.5)
LYMPHOCYTES # BLD AUTO: 3 K/UL (ref 1–4.8)
LYMPHOCYTES NFR BLD: 42.4 % (ref 18–48)
MCH RBC QN AUTO: 31.8 PG (ref 27–31)
MCHC RBC AUTO-ENTMCNC: 33.6 G/DL (ref 32–36)
MCV RBC AUTO: 95 FL (ref 82–98)
MONOCYTES # BLD AUTO: 0.6 K/UL (ref 0.3–1)
MONOCYTES NFR BLD: 8.2 % (ref 4–15)
NEUTROPHILS # BLD AUTO: 3.4 K/UL (ref 1.8–7.7)
NEUTROPHILS NFR BLD: 47 % (ref 38–73)
NRBC BLD-RTO: 0 /100 WBC
PLATELET # BLD AUTO: 294 K/UL (ref 150–350)
PMV BLD AUTO: 8.8 FL (ref 9.2–12.9)
POTASSIUM SERPL-SCNC: 5.2 MMOL/L (ref 3.5–5.1)
PROT SERPL-MCNC: 7 G/DL (ref 6–8.4)
RBC # BLD AUTO: 3.58 M/UL (ref 4–5.4)
SODIUM SERPL-SCNC: 139 MMOL/L (ref 136–145)
WBC # BLD AUTO: 7.17 K/UL (ref 3.9–12.7)

## 2020-08-10 PROCEDURE — 36415 COLL VENOUS BLD VENIPUNCTURE: CPT

## 2020-08-10 PROCEDURE — 85025 COMPLETE CBC W/AUTO DIFF WBC: CPT

## 2020-08-10 PROCEDURE — 93005 ELECTROCARDIOGRAM TRACING: CPT

## 2020-08-10 PROCEDURE — 93010 ELECTROCARDIOGRAM REPORT: CPT | Mod: ,,, | Performed by: INTERNAL MEDICINE

## 2020-08-10 PROCEDURE — 80053 COMPREHEN METABOLIC PANEL: CPT

## 2020-08-10 PROCEDURE — 93010 EKG 12-LEAD: ICD-10-PCS | Mod: ,,, | Performed by: INTERNAL MEDICINE

## 2020-08-10 NOTE — DISCHARGE INSTRUCTIONS
Your procedure  is scheduled for _8/14/2020_________.    Call [phone] between 2pm and 5pm on _8/13/2020______to find out your arrival time for the day of surgery.    Report to the Emergency Department on the day of your surgery.  You will be escorted to the admitting unit.    Important instructions:   Do not eat or drink after 12 midnight, including water.  It is okay to brush your teeth.  Do not have gum, candy or mints.     Take only these medications with a small swallow of water on the morning of your surgery __none____________    Do not take any diabetic medication on the morning of surgery unless instructed to do so by your doctor or pre op nurse.    Stop taking Aspirin, Ibuprofen, Motrin and Aleve , Fish oil, and Vitamin E for at least 7 days before your surgery. You may use Tylenol unless otherwise instructed by your doctor.       Please shower the night before and the morning of your surgery.       Do not wear make- up, including mascara.     You may wear deodorant only.      Do not wear powder, body lotion or perfume/cologne.     Do not wear any jewelry or have any metal on your body.     You will be asked to remove any dentures or partials for the procedure.     Please bring any documents given to you by your doctor.     If you are going home on the same day of surgery, you must arrange for a family member or a friend to drive you home.  Public transportation is prohibited.  You will not be able to drive home if you were given anesthesia or sedation.     Wear loose fitting clothes allowing for bandages.     Please leave money and valuables home.       You may bring your cell phone.     Call the doctor if fever or illness should occur before your surgery.    Call [phone] to contact us here if needed.

## 2020-08-11 ENCOUNTER — HOSPITAL ENCOUNTER (OUTPATIENT)
Dept: PREADMISSION TESTING | Facility: HOSPITAL | Age: 55
Discharge: HOME | End: 2020-08-11
Attending: UROLOGY
Payer: MEDICAID

## 2020-08-11 DIAGNOSIS — Z01.818 PREOP TESTING: ICD-10-CM

## 2020-08-11 LAB — SARS-COV-2 RDRP RESP QL NAA+PROBE: NEGATIVE

## 2020-08-11 PROCEDURE — U0002 COVID-19 LAB TEST NON-CDC: HCPCS

## 2020-08-14 ENCOUNTER — ANESTHESIA (OUTPATIENT)
Dept: SURGERY | Facility: HOSPITAL | Age: 55
End: 2020-08-14
Payer: MEDICAID

## 2020-08-14 ENCOUNTER — HOSPITAL ENCOUNTER (OUTPATIENT)
Facility: HOSPITAL | Age: 55
Discharge: HOME | End: 2020-08-14
Attending: UROLOGY | Admitting: UROLOGY
Payer: MEDICAID

## 2020-08-14 VITALS
OXYGEN SATURATION: 99 % | RESPIRATION RATE: 20 BRPM | BODY MASS INDEX: 22.56 KG/M2 | SYSTOLIC BLOOD PRESSURE: 130 MMHG | DIASTOLIC BLOOD PRESSURE: 77 MMHG | WEIGHT: 152.75 LBS | TEMPERATURE: 98 F | HEART RATE: 63 BPM

## 2020-08-14 DIAGNOSIS — K68.2 RETROPERITONEAL FIBROSIS: ICD-10-CM

## 2020-08-14 DIAGNOSIS — Z96.0 RETAINED URETERAL STENT: Primary | ICD-10-CM

## 2020-08-14 DIAGNOSIS — Z01.818 PREOP TESTING: ICD-10-CM

## 2020-08-14 DIAGNOSIS — N13.5 URETERAL STRICTURE: ICD-10-CM

## 2020-08-14 DIAGNOSIS — N13.5 URETER, STRICTURE: ICD-10-CM

## 2020-08-14 LAB
FINAL PATHOLOGIC DIAGNOSIS: NORMAL
GROSS: NORMAL
POCT GLUCOSE: 114 MG/DL (ref 70–110)
POCT GLUCOSE: 122 MG/DL (ref 70–110)

## 2020-08-14 PROCEDURE — 88300 SURGICAL PATH GROSS: CPT | Mod: 26,,, | Performed by: PATHOLOGY

## 2020-08-14 PROCEDURE — 37000009 HC ANESTHESIA EA ADD 15 MINS: Performed by: UROLOGY

## 2020-08-14 PROCEDURE — D9220A PRA ANESTHESIA: ICD-10-PCS | Mod: CRNA,,, | Performed by: NURSE ANESTHETIST, CERTIFIED REGISTERED

## 2020-08-14 PROCEDURE — D9220A PRA ANESTHESIA: Mod: CRNA,,, | Performed by: NURSE ANESTHETIST, CERTIFIED REGISTERED

## 2020-08-14 PROCEDURE — 52332 CYSTOSCOPY AND TREATMENT: CPT | Mod: 50,,, | Performed by: UROLOGY

## 2020-08-14 PROCEDURE — D9220A PRA ANESTHESIA: Mod: ANES,,, | Performed by: ANESTHESIOLOGY

## 2020-08-14 PROCEDURE — 25000003 PHARM REV CODE 250: Performed by: UROLOGY

## 2020-08-14 PROCEDURE — 00910 ANES TRANSURETHRAL PX NOS: CPT | Performed by: UROLOGY

## 2020-08-14 PROCEDURE — 37000008 HC ANESTHESIA 1ST 15 MINUTES: Performed by: UROLOGY

## 2020-08-14 PROCEDURE — 71000015 HC POSTOP RECOV 1ST HR: Performed by: UROLOGY

## 2020-08-14 PROCEDURE — 36000706: Performed by: UROLOGY

## 2020-08-14 PROCEDURE — 36000707: Performed by: UROLOGY

## 2020-08-14 PROCEDURE — C1758 CATHETER, URETERAL: HCPCS | Performed by: UROLOGY

## 2020-08-14 PROCEDURE — C1769 GUIDE WIRE: HCPCS | Performed by: UROLOGY

## 2020-08-14 PROCEDURE — 25000003 PHARM REV CODE 250: Performed by: NURSE ANESTHETIST, CERTIFIED REGISTERED

## 2020-08-14 PROCEDURE — 88300 SURGICAL PATH GROSS: CPT | Performed by: PATHOLOGY

## 2020-08-14 PROCEDURE — 25000003 PHARM REV CODE 250: Performed by: ANESTHESIOLOGY

## 2020-08-14 PROCEDURE — 63600175 PHARM REV CODE 636 W HCPCS: Performed by: UROLOGY

## 2020-08-14 PROCEDURE — C2617 STENT, NON-COR, TEM W/O DEL: HCPCS | Performed by: UROLOGY

## 2020-08-14 PROCEDURE — 82962 GLUCOSE BLOOD TEST: CPT | Performed by: UROLOGY

## 2020-08-14 PROCEDURE — 88300 PR  SURG PATH,GROSS,LEVEL I: ICD-10-PCS | Mod: 26,,, | Performed by: PATHOLOGY

## 2020-08-14 PROCEDURE — 71000033 HC RECOVERY, INTIAL HOUR: Performed by: UROLOGY

## 2020-08-14 PROCEDURE — D9220A PRA ANESTHESIA: ICD-10-PCS | Mod: ANES,,, | Performed by: ANESTHESIOLOGY

## 2020-08-14 PROCEDURE — 74420 UROGRAPHY RTRGR +-KUB: CPT | Mod: 26,,, | Performed by: UROLOGY

## 2020-08-14 PROCEDURE — 74420 PR  X-RAY RETROGRADE PYELOGRAM: ICD-10-PCS | Mod: 26,,, | Performed by: UROLOGY

## 2020-08-14 PROCEDURE — 63600175 PHARM REV CODE 636 W HCPCS: Performed by: NURSE ANESTHETIST, CERTIFIED REGISTERED

## 2020-08-14 PROCEDURE — 52332 PR CYSTOSCOPY,INSERT URETERAL STENT: ICD-10-PCS | Mod: 50,,, | Performed by: UROLOGY

## 2020-08-14 PROCEDURE — 25500020 PHARM REV CODE 255: Performed by: UROLOGY

## 2020-08-14 PROCEDURE — 71000016 HC POSTOP RECOV ADDL HR: Performed by: UROLOGY

## 2020-08-14 DEVICE — STENT URET PERCUFLEX 6FR 24CM: Type: IMPLANTABLE DEVICE | Site: URETER | Status: FUNCTIONAL

## 2020-08-14 DEVICE — IMPLANTABLE DEVICE: Type: IMPLANTABLE DEVICE | Site: URETER | Status: FUNCTIONAL

## 2020-08-14 RX ORDER — FENTANYL CITRATE 50 UG/ML
INJECTION, SOLUTION INTRAMUSCULAR; INTRAVENOUS
Status: DISCONTINUED | OUTPATIENT
Start: 2020-08-14 | End: 2020-08-14

## 2020-08-14 RX ORDER — PHENAZOPYRIDINE HYDROCHLORIDE 100 MG/1
200 TABLET, FILM COATED ORAL 3 TIMES DAILY PRN
Status: DISCONTINUED | OUTPATIENT
Start: 2020-08-14 | End: 2020-08-14 | Stop reason: HOSPADM

## 2020-08-14 RX ORDER — ACETAMINOPHEN 325 MG/1
650 TABLET ORAL EVERY 4 HOURS PRN
Status: DISCONTINUED | OUTPATIENT
Start: 2020-08-14 | End: 2020-08-14 | Stop reason: HOSPADM

## 2020-08-14 RX ORDER — HYDROMORPHONE HYDROCHLORIDE 2 MG/ML
0.2 INJECTION, SOLUTION INTRAMUSCULAR; INTRAVENOUS; SUBCUTANEOUS EVERY 5 MIN PRN
Status: DISCONTINUED | OUTPATIENT
Start: 2020-08-14 | End: 2020-08-14 | Stop reason: HOSPADM

## 2020-08-14 RX ORDER — PHENYLEPHRINE HYDROCHLORIDE 10 MG/ML
INJECTION INTRAVENOUS
Status: DISCONTINUED | OUTPATIENT
Start: 2020-08-14 | End: 2020-08-14

## 2020-08-14 RX ORDER — SODIUM CHLORIDE 0.9 % (FLUSH) 0.9 %
10 SYRINGE (ML) INJECTION
Status: DISCONTINUED | OUTPATIENT
Start: 2020-08-14 | End: 2020-08-14 | Stop reason: HOSPADM

## 2020-08-14 RX ORDER — SODIUM CHLORIDE 9 MG/ML
INJECTION, SOLUTION INTRAVENOUS CONTINUOUS
Status: DISCONTINUED | OUTPATIENT
Start: 2020-08-14 | End: 2020-08-14 | Stop reason: HOSPADM

## 2020-08-14 RX ORDER — HYDROCODONE BITARTRATE AND ACETAMINOPHEN 5; 325 MG/1; MG/1
1 TABLET ORAL EVERY 4 HOURS PRN
Status: DISCONTINUED | OUTPATIENT
Start: 2020-08-14 | End: 2020-08-14 | Stop reason: HOSPADM

## 2020-08-14 RX ORDER — PHENAZOPYRIDINE HYDROCHLORIDE 100 MG/1
200 TABLET, FILM COATED ORAL 3 TIMES DAILY PRN
Qty: 18 TABLET | Refills: 0 | Status: SHIPPED | OUTPATIENT
Start: 2020-08-14 | End: 2021-09-13

## 2020-08-14 RX ORDER — PROPOFOL 10 MG/ML
VIAL (ML) INTRAVENOUS
Status: DISCONTINUED | OUTPATIENT
Start: 2020-08-14 | End: 2020-08-14

## 2020-08-14 RX ORDER — LIDOCAINE HYDROCHLORIDE 10 MG/ML
1 INJECTION, SOLUTION EPIDURAL; INFILTRATION; INTRACAUDAL; PERINEURAL ONCE
Status: DISCONTINUED | OUTPATIENT
Start: 2020-08-14 | End: 2020-08-14 | Stop reason: HOSPADM

## 2020-08-14 RX ORDER — HYDROCODONE BITARTRATE AND ACETAMINOPHEN 5; 325 MG/1; MG/1
1 TABLET ORAL EVERY 6 HOURS PRN
Qty: 4 TABLET | Refills: 0 | Status: SHIPPED | OUTPATIENT
Start: 2020-08-14 | End: 2021-09-13

## 2020-08-14 RX ORDER — ONDANSETRON 2 MG/ML
4 INJECTION INTRAMUSCULAR; INTRAVENOUS EVERY 12 HOURS PRN
Status: DISCONTINUED | OUTPATIENT
Start: 2020-08-14 | End: 2020-08-14 | Stop reason: HOSPADM

## 2020-08-14 RX ORDER — MIDAZOLAM HYDROCHLORIDE 1 MG/ML
INJECTION, SOLUTION INTRAMUSCULAR; INTRAVENOUS
Status: DISCONTINUED | OUTPATIENT
Start: 2020-08-14 | End: 2020-08-14

## 2020-08-14 RX ORDER — ONDANSETRON 2 MG/ML
4 INJECTION INTRAMUSCULAR; INTRAVENOUS DAILY PRN
Status: DISCONTINUED | OUTPATIENT
Start: 2020-08-14 | End: 2020-08-14 | Stop reason: HOSPADM

## 2020-08-14 RX ORDER — CEFAZOLIN SODIUM 2 G/50ML
2 SOLUTION INTRAVENOUS
Status: DISCONTINUED | OUTPATIENT
Start: 2020-08-14 | End: 2020-08-14 | Stop reason: HOSPADM

## 2020-08-14 RX ORDER — LIDOCAINE HYDROCHLORIDE 20 MG/ML
INJECTION INTRAVENOUS
Status: DISCONTINUED | OUTPATIENT
Start: 2020-08-14 | End: 2020-08-14

## 2020-08-14 RX ORDER — CEPHALEXIN 500 MG/1
500 CAPSULE ORAL EVERY 12 HOURS
Qty: 4 CAPSULE | Refills: 0 | Status: SHIPPED | OUTPATIENT
Start: 2020-08-14 | End: 2021-09-13

## 2020-08-14 RX ORDER — DEXAMETHASONE SODIUM PHOSPHATE 4 MG/ML
INJECTION, SOLUTION INTRA-ARTICULAR; INTRALESIONAL; INTRAMUSCULAR; INTRAVENOUS; SOFT TISSUE
Status: DISCONTINUED | OUTPATIENT
Start: 2020-08-14 | End: 2020-08-14

## 2020-08-14 RX ADMIN — PROPOFOL 100 MG: 10 INJECTION, EMULSION INTRAVENOUS at 10:08

## 2020-08-14 RX ADMIN — PHENYLEPHRINE HYDROCHLORIDE 200 MCG: 10 INJECTION INTRAVENOUS at 11:08

## 2020-08-14 RX ADMIN — FENTANYL CITRATE 50 MCG: 50 INJECTION INTRAMUSCULAR; INTRAVENOUS at 10:08

## 2020-08-14 RX ADMIN — MIDAZOLAM HYDROCHLORIDE 2 MG: 1 INJECTION, SOLUTION INTRAMUSCULAR; INTRAVENOUS at 10:08

## 2020-08-14 RX ADMIN — PHENAZOPYRIDINE HYDROCHLORIDE 200 MG: 100 TABLET ORAL at 01:08

## 2020-08-14 RX ADMIN — FENTANYL CITRATE 50 MCG: 50 INJECTION INTRAMUSCULAR; INTRAVENOUS at 11:08

## 2020-08-14 RX ADMIN — PHENYLEPHRINE HYDROCHLORIDE 200 MCG: 10 INJECTION INTRAVENOUS at 10:08

## 2020-08-14 RX ADMIN — SODIUM CHLORIDE: 0.9 INJECTION, SOLUTION INTRAVENOUS at 09:08

## 2020-08-14 RX ADMIN — SODIUM CHLORIDE: 0.9 INJECTION, SOLUTION INTRAVENOUS at 10:08

## 2020-08-14 RX ADMIN — Medication 80 MG: at 10:08

## 2020-08-14 RX ADMIN — DEXAMETHASONE SODIUM PHOSPHATE 4 MG: 4 INJECTION, SOLUTION INTRAMUSCULAR; INTRAVENOUS at 10:08

## 2020-08-14 RX ADMIN — ONDANSETRON 4 MG: 2 INJECTION, SOLUTION INTRAMUSCULAR; INTRAVENOUS at 10:08

## 2020-08-14 RX ADMIN — CEFAZOLIN SODIUM 2 G: 1 POWDER, FOR SOLUTION INTRAMUSCULAR; INTRAVENOUS at 10:08

## 2020-08-14 NOTE — TRANSFER OF CARE
Anesthesia Transfer of Care Note    Patient: Lizy Jimenez    Procedure(s) Performed: Procedure(s) (LRB):  CYSTOURETEROSCOPY, WITH RETROGRADE PYELOGRAM AND URETERAL STENT INSERTION - bilateral stent exchange (Bilateral)    Patient location: PACU    Anesthesia Type: general    Transport from OR: Transported from OR on room air with adequate spontaneous ventilation    Post pain: adequate analgesia    Post assessment: no apparent anesthetic complications and tolerated procedure well    Post vital signs: stable    Level of consciousness: sedated and responds to stimulation    Nausea/Vomiting: no nausea/vomiting    Complications: none    Transfer of care protocol was followed      Last vitals:   Visit Vitals  /73 (BP Location: Right arm, Patient Position: Lying)   Pulse 73   Temp 36.5 °C (97.7 °F) (Oral)   Resp 10   Wt 69.3 kg (152 lb 12.5 oz)   SpO2 100%   Breastfeeding No   BMI 22.56 kg/m²      Patient notified and aware.

## 2020-08-14 NOTE — ANESTHESIA PREPROCEDURE EVALUATION
08/14/2020  Lizy Jimenez is a 54 y.o., female.    Anesthesia Evaluation    I have reviewed the Patient Summary Reports.    I have reviewed the Nursing Notes.       Review of Systems  Anesthesia Hx:  No problems with previous Anesthesia  Denies Family Hx of Anesthesia complications.   Denies Personal Hx of Anesthesia complications.   Social:  Non-Smoker, No Alcohol Use    Cardiovascular:   Exercise tolerance: good Hypertension Denies MI.   Denies Dysrhythmias.         Pulmonary:  Pulmonary Normal    Renal/:   Chronic Renal Disease Interstitial cystitis, ureteral stricture   Hepatic/GI:  Hepatic/GI Normal    OB/GYN/PEDS:  Post-menopausal 10 yrs   Neurological:  Neurology Normal    Endocrine:   Diabetes        Physical Exam  General:  Well nourished    Airway/Jaw/Neck:  Airway Findings: Mouth Opening: Normal Tongue: Normal  Mallampati: II  TM Distance: 4 - 6 cm  Jaw/Neck Findings:  Neck ROM: Normal ROM      Dental:  Dental Findings:    Chest/Lungs:  Chest/Lungs Findings: Clear to auscultation, Normal Respiratory Rate     Heart/Vascular:  Heart Findings: Rate: Normal  Rhythm: Regular Rhythm        Mental Status:  Mental Status Findings:  Cooperative, Alert and Oriented       Wt Readings from Last 3 Encounters:   08/10/20 69.3 kg (152 lb 12.5 oz)   07/27/20 70.1 kg (154 lb 10.4 oz)   08/27/19 76.2 kg (167 lb 15.9 oz)     Temp Readings from Last 3 Encounters:   08/10/20 36.8 °C (98.3 °F) (Skin)   07/19/19 36.4 °C (97.5 °F) (Oral)   07/12/19 36.4 °C (97.6 °F) (Oral)     BP Readings from Last 3 Encounters:   08/10/20 98/67   08/27/19 110/60   07/19/19 120/80     Pulse Readings from Last 3 Encounters:   08/10/20 66   08/27/19 68   07/19/19 67     Lab Results   Component Value Date    WBC 7.17 08/10/2020    HGB 11.4 (L) 08/10/2020    HCT 33.9 (L) 08/10/2020    MCV 95 08/10/2020     08/10/2020        CMP  Sodium   Date Value Ref Range Status   08/10/2020 139 136 - 145 mmol/L Final     Potassium   Date Value Ref Range Status   08/10/2020 5.2 (H) 3.5 - 5.1 mmol/L Final     Chloride   Date Value Ref Range Status   08/10/2020 104 95 - 110 mmol/L Final     CO2   Date Value Ref Range Status   08/10/2020 30 (H) 23 - 29 mmol/L Final     Glucose   Date Value Ref Range Status   08/10/2020 138 (H) 70 - 110 mg/dL Final     BUN, Bld   Date Value Ref Range Status   08/10/2020 13 6 - 20 mg/dL Final     Creatinine   Date Value Ref Range Status   08/10/2020 1.0 0.5 - 1.4 mg/dL Final     Calcium   Date Value Ref Range Status   08/10/2020 9.4 8.7 - 10.5 mg/dL Final     Total Protein   Date Value Ref Range Status   08/10/2020 7.0 6.0 - 8.4 g/dL Final     Albumin   Date Value Ref Range Status   08/10/2020 3.9 3.5 - 5.2 g/dL Final     Total Bilirubin   Date Value Ref Range Status   08/10/2020 0.4 0.1 - 1.0 mg/dL Final     Comment:     For infants and newborns, interpretation of results should be based  on gestational age, weight and in agreement with clinical  observations.  Premature Infant recommended reference ranges:  Up to 24 hours.............<8.0 mg/dL  Up to 48 hours............<12.0 mg/dL  3-5 days..................<15.0 mg/dL  6-29 days.................<15.0 mg/dL       Alkaline Phosphatase   Date Value Ref Range Status   08/10/2020 68 55 - 135 U/L Final     AST   Date Value Ref Range Status   08/10/2020 15 10 - 40 U/L Final     ALT   Date Value Ref Range Status   08/10/2020 11 10 - 44 U/L Final     Anion Gap   Date Value Ref Range Status   08/10/2020 5 (L) 8 - 16 mmol/L Final     eGFR if    Date Value Ref Range Status   08/10/2020 >60 >60 mL/min/1.73 m^2 Final     eGFR if non    Date Value Ref Range Status   08/10/2020 >60 >60 mL/min/1.73 m^2 Final     Comment:     Calculation used to obtain the estimated glomerular filtration  rate (eGFR) is the CKD-EPI equation.        8/11/2020  COVID negative    Anesthesia Plan  Type of Anesthesia, risks & benefits discussed:  Anesthesia Type:  general  Patient's Preference:   Intra-op Monitoring Plan: standard ASA monitors  Intra-op Monitoring Plan Comments:   Post Op Pain Control Plan: multimodal analgesia and per primary service following discharge from PACU  Post Op Pain Control Plan Comments:   Induction:   IV  Beta Blocker:  Patient is not currently on a Beta-Blocker (No further documentation required).       Informed Consent: Patient understands risks and agrees with Anesthesia plan.  Questions answered. Anesthesia consent signed with patient.  ASA Score: 2     Day of Surgery Review of History & Physical: I have interviewed and examined the patient. I have reviewed the patient's H&P dated:            Ready For Surgery From Anesthesia Perspective.

## 2020-08-14 NOTE — DISCHARGE INSTRUCTIONS
BATHING/DRESSING:  ? Ok to shower tomorrow    ACTIVITY LEVEL: If you have received sedation or an anesthetic, you may feel sleepy for several hours. Rest until you are more awake. Gradually resume your normal activities.    No heavy lifting.      DIET: You may resume your home diet. If nausea is present, increase your diet gradually with fluids and bland foods.  Medications: Pain medication should be taken only if needed and as directed. If antibiotics are prescribed, the medication should be taken until completed. You will be given an updated list of you medications.    Last dose of Pyridium 1:28 pm  ? No driving, alcoholic beverages or signing legal documents for next 24 hours or while taking pain medication    CALL THE DOCTOR:    For any obvious bleeding (some dried blood over the incision is normal).    Some blood in your urine is normal.     Redness, swelling, foul smell around incision or fever over 101.   Shortness of breath, Coughing Up Bloody Sputum, or Pains or Swelling in your Calves..   Persistent pain or nausea not relieved by medication.   Problems urinating - unable to urinate or heavy bleeding (with our without clots) in urine.    If any unusual problems or difficulties occur contact your doctor. If you cannot contact your doctor but feel your signs and symptoms warrant a physicians attention return to the emergency room.  Fall Prevention  Millions of people fall every year and injure themselves. You may have had anesthesia or sedation which may increase your risk of falling. You may have health issues that put you at an increased risk of falling.     Here are ways to reduce your risk of falling.  ·   · Make your home safe by keeping walkways clear of objects you may trip over.  · Use non-slip pads under rugs. Do not use area rugs or small throw rugs.  · Use non-slip mats in bathtubs and showers.  · Install handrails and lights on staircases.  · Do not walk in poorly lit areas.  · Do not  stand on chairs or wobbly ladders.  · Use caution when reaching overhead or looking upward. This position can cause a loss of balance.  · Be sure your shoes fit properly, have non-slip bottoms and are in good condition.   · Wear shoes both inside and out. Avoid going barefoot or wearing slippers.  · Be cautious when going up and down stairs, curbs, and when walking on uneven sidewalks.  · If your balance is poor, consider using a cane or walker.  · If your fall was related to alcohol use, stop or limit alcohol intake.   · If your fall was related to use of sleeping medicines, talk to your doctor about this. You may need to reduce your dosage at bedtime if you awaken during the night to go to the bathroom.    · To reduce the need for nighttime bathroom trips:  ¨ Avoid drinking fluids for several hours before going to bed  ¨ Empty your bladder before going to bed  ¨ Men can keep a urinal at the bedside  · Stay as active as you can. Balance, flexibility, strength, and endurance all come from exercise. They all play a role in preventing falls. Ask your healthcare provider which types of activity are right for you.  · Get your vision checked on a regular basis.  · If you have pets, know where they are before you stand up or walk so you don't trip over them.  · Use night lights.    Expect blood and/or burning with urination. Drink plenty of fluids.

## 2020-08-14 NOTE — ANESTHESIA PROCEDURE NOTES
Intubation  Performed by: Clotilde Wetzel CRNA  Authorized by: Gaurav Cobian MD     Intubation:     Induction:  Intravenous    Intubated:  Postinduction    Mask Ventilation:  N/a    Attempts:  1    Attempted By:  CRNA    Difficult Airway Encountered?: No      Complications:  None    Airway Device:  Supraglottic airway/LMA    Airway Device Size:  4.0    Placement Verified By:  Capnometry    Complicating Factors:  None    Findings Post-Intubation:  BS equal bilateral

## 2020-08-14 NOTE — OP NOTE
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  She has been managed with indwelling ureteral stents.  She has been lost to followup multiple times.  She presents now after extended period of retained stents.  Her last stent exchange was 7/19/2019.  She presents today for stent exchange.  She understands possible complications due to her retained stents.  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    Description of Procedure: The patient was brought to operating room and placed under general anesthesia. Full time out procedures were performed identifying correct patient, procedure and laterality. Appropriate antibiotics with Ancef were given prior to commencement of surgery. The patient was placed in dorsal lithotomy position and prepped and draped in the usual sterile fashion.    A 22Fr rigid cystoscopy was placed per urethral and passed into the bladder. No urethral strictures were noted. Cystoscopy did not reveal any abnormality of the bladder other than previously placed stents.  film was obtained showing bilateral ureteral stents, two on left.         The stents were examined and were noted to be dark in nature, but had no stone encrustation.  First the right side was addressed and a  sensor wire was passed alongside the stent and up to the level of the kidney.  Once the wire was in place, the stent was grasped and removed in its entirety.  The stent was able to be removed with minimal to no resistance.  Next, a dual lumen exchange catheter was passed over the wire and a retrograde pyelogram was performed with full strength Omnipaque solution.  There was noted to be narrow distal ureter and moderate hydroureteronephrosis proximal to this.  She was noted to have a partially duplicated system, but one ureter down from the proximal ureter down to the bladder.  Next, a cystoscope was replaced back into the bladder over the wire.  A 6-Irish x 22 cm double-J ureteral stent with no string was then passed over the wire and up to the level of the kidney.  The wire was removed and good curls noted in the proximal and distal portion of the stent.     Similar procedure was then performed on the patient's left side.  Initially, the upper pole/medial ureteral orifice was addressed. The stent was grasped with flexible graspers and brought out to the urethral meatus.  The wire was able to be passed through the lumen of the stent and up to the level of the upper pole of the left kidney. The stent was then removed.  A retrograde pyelogram was performed with a dual-lumen exchange catheter that showed with narrowed distal ureter and moderate upper pole hydronephrosis. Cystoscope was replaced back into the bladder and a 6 Irish by 24 cm double-J ureteral stent was passed over the wire into the upper pole moiety.        A similar procedure was performed for the lower pole moiety where stent was grasped and brought to the urethral meatus.  A wire was passed the lumen of the stent up to level of the lower pole moiety. Stent removed. There was no resistance in removal of the stent.  Retrograde pyelogram was performed showing moderate proximal hydronephrosis of the lower pole moiety. The cystoscope was replaced back  into the bladder and a 6-Tajik x 20 cm JJ stent was then passed over the wire and up to the level of the kidney.  The wire was removed and coil was noted in the proximal portion and distal portion of the stent.      At the conclusion of the procedure, all 3 stents were in the proper position.  There was minimal to no hematuria noted from the ureters. The bladder was then drained and the cystoscope was removed.  The patient was awakened from general anesthesia and transferred to the PACU in stable   condition.     Findings:  No encrustation noted on retained stents.  Stents exchanged without issue.  Two stents placed on left in complete duplication of collecting system    Complications: No    Estimated Blood Loss (EBL): 0cc    Drains: R - 6Fr x 22cm JJ stent.   L UP - 6Fr x 24cm JJ stent.   L LP - 6Fr x 20cm JJ stent           Implants:   Implant Name Type Inv. Item Serial No.  Lot No. LRB No. Used Action   STENT URTRL PERCUFLEX 6F 22CM - [id number]  STENT URTRL PERCUFLEX 6F 22CM  [employer]  Right 1 Implanted   STENT URET PERCUFLEX 6FR 20CM - [id number]  STENT URET PERCUFLEX 6FR 20CM  [employer]  Left 1 Implanted   STENT URET PERCUFLEX 6FR 24CM - [id number]  STENT URET PERCUFLEX 6FR 24CM  [employer]  Left 1 Implanted       Specimens:   Specimen (12h ago, onward)    None                  Condition: Good    Disposition: PACU - hemodynamically stable.    Attestation: I was present and scrubbed for the entire procedure.    Discharge Note    SUMMARY     Admit Date: 8/14/2020    Discharge Date and Time:  08/14/2020 11:25 AM    Hospital Course (synopsis of major diagnoses, care, treatment, and services provided during the course of the hospital stay): Uncomplicated cysto/stent placement.      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     HYDROcodone-acetaminophen 5-325 mg per tablet  Commonly known as: NORCO  Take 1 tablet by mouth every 6 (six) hours as needed for Pain.     phenazopyridine 100 MG tablet  Commonly known as: PYRIDIUM  Take 2 tablets (200 mg total) by mouth 3 (three) times daily as needed for Pain.        CONTINUE taking these medications    atorvastatin 20 MG tablet  Commonly known as: LIPITOR  Take 20 mg by mouth once daily.     lisinopriL 10 MG tablet  Take 10 mg by mouth once daily.     metFORMIN 500 MG tablet  Commonly known as: GLUCOPHAGE  Take 500 mg by mouth 2 (two) times daily with meals.     TRUE METRIX GLUCOSE TEST STRIP Strp  Generic drug: blood sugar diagnostic  USE 1 STRIP TO CHECK GLUCOSE THREE TIMES DAILY     TRUEPLUS LANCETS 33 gauge Misc  Generic drug: lancets  USE 1 TO CHECK GLUCOSE THREE TIMES DAILY          Discharge Procedure Orders   Diet general     Call MD for:  temperature >100.4     Call MD for:  persistent nausea and vomiting     Call MD for:  severe uncontrolled pain     Call MD for:  difficulty breathing, headache or visual disturbances     No dressing needed     Activity as tolerated

## 2020-08-14 NOTE — INTERVAL H&P NOTE
The patient has been examined and the H&P has been reviewed:    I concur with the findings and no changes have occurred since H&P was written.    Surgery risks, benefits and alternative options discussed and understood by patient/family.          Active Hospital Problems    Diagnosis  POA    Retained ureteral stent [Z96.0]  Not Applicable      Resolved Hospital Problems   No resolved problems to display.

## 2020-08-14 NOTE — ANESTHESIA POSTPROCEDURE EVALUATION
Anesthesia Post Evaluation    Patient: Lizy Jimenez    Procedure(s) Performed: Procedure(s) (LRB):  CYSTOURETEROSCOPY, WITH RETROGRADE PYELOGRAM AND URETERAL STENT INSERTION - bilateral stent exchange (Bilateral)    Final Anesthesia Type: general    Patient location during evaluation: PACU  Patient participation: Yes- Able to Participate  Level of consciousness: awake and alert  Post-procedure vital signs: reviewed and stable  Pain management: adequate  Airway patency: patent    PONV status at discharge: No PONV  Anesthetic complications: no      Cardiovascular status: hemodynamically stable  Respiratory status: unassisted and spontaneous ventilation  Hydration status: euvolemic  Follow-up not needed.          Vitals Value Taken Time   /76 08/14/20 1221   Temp 36.6 °C (97.9 °F) 08/14/20 1221   Pulse 69 08/14/20 1221   Resp 20 08/14/20 1221   SpO2 98 % 08/14/20 1221         Event Time   Out of Recovery 12:19:05         Pain/Nick Score: Nick Score: 10 (8/14/2020 12:21 PM)

## 2020-10-07 ENCOUNTER — TELEPHONE (OUTPATIENT)
Dept: SURGERY | Facility: CLINIC | Age: 55
End: 2020-10-07

## 2020-10-07 NOTE — TELEPHONE ENCOUNTER
----- Message from Tomeka Boswell sent at 10/7/2020  1:40 PM CDT -----  Regarding: colocoscopy order  Hi Dr Dean and staff,     BETO Burger is ordering a colonoscopy for this Medicaid pt.     Can you plz contact her to schedule an appt.     The order is in media.     We have also requested records which I will scan into media once available.     Thank you,    Tomeka Boswell  Physician Referral Specialist  [facility]  Office [phone]  Fax [phone]

## 2020-10-08 ENCOUNTER — TELEPHONE (OUTPATIENT)
Dept: SURGERY | Facility: CLINIC | Age: 55
End: 2020-10-08

## 2020-10-08 NOTE — TELEPHONE ENCOUNTER
Patient returned call in reference to a referral to  Ambulatory Colorectal Surgery for colon cancer screening, left voice message.Patient was given a list of available dates in the month of November to be scheduled for the Colonoscopy. Patient stated I will call back to schedule the procedure when I can confirm my work schedule, and how my work schedule complements the verified the afore mentioned dates for the month of November  To schedule the Colonoscopy..

## 2021-09-13 ENCOUNTER — OFFICE VISIT (OUTPATIENT)
Dept: UROLOGY | Facility: CLINIC | Age: 56
End: 2021-09-13
Payer: MEDICAID

## 2021-09-13 VITALS — BODY MASS INDEX: 20.65 KG/M2 | WEIGHT: 139.44 LBS | HEIGHT: 69 IN

## 2021-09-13 DIAGNOSIS — Z96.0 RETAINED URETERAL STENT: Primary | ICD-10-CM

## 2021-09-13 DIAGNOSIS — K68.2 RETROPERITONEAL FIBROSIS: ICD-10-CM

## 2021-09-13 DIAGNOSIS — R39.89 SUSPECTED UTI: ICD-10-CM

## 2021-09-13 DIAGNOSIS — N13.5 URETERAL STRICTURE: ICD-10-CM

## 2021-09-13 LAB
BILIRUB SERPL-MCNC: NORMAL MG/DL
BLOOD URINE, POC: NORMAL
CLARITY, POC UA: CLEAR
COLOR, POC UA: YELLOW
GLUCOSE UR QL STRIP: NORMAL
KETONES UR QL STRIP: NORMAL
LEUKOCYTE ESTERASE URINE, POC: NORMAL
NITRITE, POC UA: POSITIVE
PH, POC UA: 7
PROTEIN, POC: NORMAL
SPECIFIC GRAVITY, POC UA: 1005
UROBILINOGEN, POC UA: NORMAL

## 2021-09-13 PROCEDURE — 87086 URINE CULTURE/COLONY COUNT: CPT | Performed by: UROLOGY

## 2021-09-13 PROCEDURE — 99214 OFFICE O/P EST MOD 30 MIN: CPT | Mod: S$PBB,,, | Performed by: UROLOGY

## 2021-09-13 PROCEDURE — 99213 OFFICE O/P EST LOW 20 MIN: CPT | Mod: PBBFAC | Performed by: UROLOGY

## 2021-09-13 PROCEDURE — 99999 PR PBB SHADOW E&M-EST. PATIENT-LVL III: ICD-10-PCS | Mod: PBBFAC,,, | Performed by: UROLOGY

## 2021-09-13 PROCEDURE — 81002 URINALYSIS NONAUTO W/O SCOPE: CPT | Mod: PBBFAC | Performed by: UROLOGY

## 2021-09-13 PROCEDURE — 99999 PR PBB SHADOW E&M-EST. PATIENT-LVL III: CPT | Mod: PBBFAC,,, | Performed by: UROLOGY

## 2021-09-13 PROCEDURE — 99214 PR OFFICE/OUTPT VISIT, EST, LEVL IV, 30-39 MIN: ICD-10-PCS | Mod: S$PBB,,, | Performed by: UROLOGY

## 2021-09-13 RX ORDER — SULFAMETHOXAZOLE AND TRIMETHOPRIM 800; 160 MG/1; MG/1
1 TABLET ORAL 2 TIMES DAILY
Qty: 14 TABLET | Refills: 0 | Status: SHIPPED | OUTPATIENT
Start: 2021-09-13 | End: 2021-09-20

## 2021-09-15 ENCOUNTER — TELEPHONE (OUTPATIENT)
Dept: UROLOGY | Facility: CLINIC | Age: 56
End: 2021-09-15

## 2021-09-15 LAB — BACTERIA UR CULT: NORMAL

## 2021-09-16 ENCOUNTER — TELEPHONE (OUTPATIENT)
Dept: UROLOGY | Facility: CLINIC | Age: 56
End: 2021-09-16

## 2021-09-17 ENCOUNTER — TELEPHONE (OUTPATIENT)
Dept: UROLOGY | Facility: CLINIC | Age: 56
End: 2021-09-17

## 2021-09-17 DIAGNOSIS — N13.5 URETERAL STRICTURE: ICD-10-CM

## 2021-09-17 DIAGNOSIS — Z96.0 RETAINED URETERAL STENT: Primary | ICD-10-CM

## 2021-09-17 DIAGNOSIS — K68.2 RETROPERITONEAL FIBROSIS: ICD-10-CM

## 2021-09-17 DIAGNOSIS — N13.5 URETERAL OBSTRUCTION: ICD-10-CM

## 2021-10-05 ENCOUNTER — HOSPITAL ENCOUNTER (OUTPATIENT)
Dept: PREADMISSION TESTING | Facility: HOSPITAL | Age: 56
Discharge: HOME | End: 2021-10-05
Attending: UROLOGY
Payer: MEDICAID

## 2021-10-05 VITALS
TEMPERATURE: 98 F | SYSTOLIC BLOOD PRESSURE: 89 MMHG | HEIGHT: 69 IN | OXYGEN SATURATION: 98 % | RESPIRATION RATE: 17 BRPM | BODY MASS INDEX: 21.19 KG/M2 | WEIGHT: 143.06 LBS | HEART RATE: 61 BPM | DIASTOLIC BLOOD PRESSURE: 61 MMHG

## 2021-10-05 DIAGNOSIS — Z01.818 PREOP TESTING: Primary | ICD-10-CM

## 2021-10-05 DIAGNOSIS — Z01.812 ENCOUNTER FOR PREOPERATIVE SCREENING LABORATORY TESTING FOR COVID-19 VIRUS: ICD-10-CM

## 2021-10-05 DIAGNOSIS — Z11.52 ENCOUNTER FOR PREOPERATIVE SCREENING LABORATORY TESTING FOR COVID-19 VIRUS: ICD-10-CM

## 2021-10-05 LAB
ANION GAP SERPL CALC-SCNC: 6 MMOL/L (ref 8–16)
BASOPHILS # BLD AUTO: 0.05 K/UL (ref 0–0.2)
BASOPHILS NFR BLD: 0.7 % (ref 0–1.9)
BUN SERPL-MCNC: 17 MG/DL (ref 6–20)
CALCIUM SERPL-MCNC: 10.3 MG/DL (ref 8.7–10.5)
CHLORIDE SERPL-SCNC: 104 MMOL/L (ref 95–110)
CO2 SERPL-SCNC: 27 MMOL/L (ref 23–29)
CREAT SERPL-MCNC: 1 MG/DL (ref 0.5–1.4)
DIFFERENTIAL METHOD: ABNORMAL
EOSINOPHIL # BLD AUTO: 0.2 K/UL (ref 0–0.5)
EOSINOPHIL NFR BLD: 2.4 % (ref 0–8)
ERYTHROCYTE [DISTWIDTH] IN BLOOD BY AUTOMATED COUNT: 11.6 % (ref 11.5–14.5)
EST. GFR  (AFRICAN AMERICAN): >60 ML/MIN/1.73 M^2
EST. GFR  (NON AFRICAN AMERICAN): >60 ML/MIN/1.73 M^2
GLUCOSE SERPL-MCNC: 110 MG/DL (ref 70–110)
HCT VFR BLD AUTO: 35.5 % (ref 37–48.5)
HGB BLD-MCNC: 11.8 G/DL (ref 12–16)
IMM GRANULOCYTES # BLD AUTO: 0.01 K/UL (ref 0–0.04)
IMM GRANULOCYTES NFR BLD AUTO: 0.1 % (ref 0–0.5)
LYMPHOCYTES # BLD AUTO: 2.6 K/UL (ref 1–4.8)
LYMPHOCYTES NFR BLD: 36.5 % (ref 18–48)
MCH RBC QN AUTO: 32.2 PG (ref 27–31)
MCHC RBC AUTO-ENTMCNC: 33.2 G/DL (ref 32–36)
MCV RBC AUTO: 97 FL (ref 82–98)
MONOCYTES # BLD AUTO: 0.7 K/UL (ref 0.3–1)
MONOCYTES NFR BLD: 9.9 % (ref 4–15)
NEUTROPHILS # BLD AUTO: 3.6 K/UL (ref 1.8–7.7)
NEUTROPHILS NFR BLD: 50.4 % (ref 38–73)
NRBC BLD-RTO: 0 /100 WBC
PLATELET # BLD AUTO: 328 K/UL (ref 150–450)
PMV BLD AUTO: 9 FL (ref 9.2–12.9)
POTASSIUM SERPL-SCNC: 4.5 MMOL/L (ref 3.5–5.1)
RBC # BLD AUTO: 3.66 M/UL (ref 4–5.4)
SARS-COV-2 RDRP RESP QL NAA+PROBE: NEGATIVE
SODIUM SERPL-SCNC: 137 MMOL/L (ref 136–145)
WBC # BLD AUTO: 7.06 K/UL (ref 3.9–12.7)

## 2021-10-05 PROCEDURE — 85025 COMPLETE CBC W/AUTO DIFF WBC: CPT | Performed by: UROLOGY

## 2021-10-05 PROCEDURE — 93010 ELECTROCARDIOGRAM REPORT: CPT | Mod: ,,, | Performed by: INTERNAL MEDICINE

## 2021-10-05 PROCEDURE — U0002 COVID-19 LAB TEST NON-CDC: HCPCS | Performed by: UROLOGY

## 2021-10-05 PROCEDURE — 80048 BASIC METABOLIC PNL TOTAL CA: CPT | Performed by: UROLOGY

## 2021-10-05 PROCEDURE — 93005 ELECTROCARDIOGRAM TRACING: CPT

## 2021-10-05 PROCEDURE — 93010 EKG 12-LEAD: ICD-10-PCS | Mod: ,,, | Performed by: INTERNAL MEDICINE

## 2021-10-08 ENCOUNTER — HOSPITAL ENCOUNTER (OUTPATIENT)
Facility: HOSPITAL | Age: 56
Discharge: HOME | End: 2021-10-08
Attending: UROLOGY | Admitting: UROLOGY
Payer: MEDICAID

## 2021-10-08 ENCOUNTER — ANESTHESIA (OUTPATIENT)
Dept: SURGERY | Facility: HOSPITAL | Age: 56
End: 2021-10-08
Payer: MEDICAID

## 2021-10-08 ENCOUNTER — ANESTHESIA EVENT (OUTPATIENT)
Dept: SURGERY | Facility: HOSPITAL | Age: 56
End: 2021-10-08
Payer: MEDICAID

## 2021-10-08 VITALS
RESPIRATION RATE: 18 BRPM | TEMPERATURE: 97 F | SYSTOLIC BLOOD PRESSURE: 139 MMHG | WEIGHT: 143.06 LBS | OXYGEN SATURATION: 100 % | BODY MASS INDEX: 21.13 KG/M2 | HEART RATE: 57 BPM | DIASTOLIC BLOOD PRESSURE: 78 MMHG

## 2021-10-08 DIAGNOSIS — Z11.52 ENCOUNTER FOR PREOPERATIVE SCREENING LABORATORY TESTING FOR COVID-19 VIRUS: ICD-10-CM

## 2021-10-08 DIAGNOSIS — Z96.0 RETAINED URETERAL STENT: Primary | ICD-10-CM

## 2021-10-08 DIAGNOSIS — K68.2 RETROPERITONEAL FIBROSIS: ICD-10-CM

## 2021-10-08 DIAGNOSIS — Z01.818 PREOPERATIVE TESTING: ICD-10-CM

## 2021-10-08 DIAGNOSIS — N13.5 URETERAL OBSTRUCTION: ICD-10-CM

## 2021-10-08 DIAGNOSIS — Z01.812 ENCOUNTER FOR PREOPERATIVE SCREENING LABORATORY TESTING FOR COVID-19 VIRUS: ICD-10-CM

## 2021-10-08 DIAGNOSIS — N13.5 URETERAL STRICTURE: ICD-10-CM

## 2021-10-08 PROCEDURE — 25000003 PHARM REV CODE 250: Performed by: REGISTERED NURSE

## 2021-10-08 PROCEDURE — 63600175 PHARM REV CODE 636 W HCPCS: Performed by: UROLOGY

## 2021-10-08 PROCEDURE — D9220A PRA ANESTHESIA: Mod: CRNA,,, | Performed by: REGISTERED NURSE

## 2021-10-08 PROCEDURE — 36000707: Performed by: UROLOGY

## 2021-10-08 PROCEDURE — 88300 PR  SURG PATH,GROSS,LEVEL I: ICD-10-PCS | Mod: 26,,, | Performed by: PATHOLOGY

## 2021-10-08 PROCEDURE — D9220A PRA ANESTHESIA: ICD-10-PCS | Mod: ANES,,, | Performed by: ANESTHESIOLOGY

## 2021-10-08 PROCEDURE — 25000003 PHARM REV CODE 250: Performed by: ANESTHESIOLOGY

## 2021-10-08 PROCEDURE — 63600175 PHARM REV CODE 636 W HCPCS: Performed by: ANESTHESIOLOGY

## 2021-10-08 PROCEDURE — 25500020 PHARM REV CODE 255: Performed by: UROLOGY

## 2021-10-08 PROCEDURE — 88300 SURGICAL PATH GROSS: CPT | Performed by: PATHOLOGY

## 2021-10-08 PROCEDURE — 00910 ANES TRANSURETHRAL PX NOS: CPT | Performed by: UROLOGY

## 2021-10-08 PROCEDURE — 71000016 HC POSTOP RECOV ADDL HR: Performed by: UROLOGY

## 2021-10-08 PROCEDURE — 37000008 HC ANESTHESIA 1ST 15 MINUTES: Performed by: UROLOGY

## 2021-10-08 PROCEDURE — 71000015 HC POSTOP RECOV 1ST HR: Performed by: UROLOGY

## 2021-10-08 PROCEDURE — 74420 PR  X-RAY RETROGRADE PYELOGRAM: ICD-10-PCS | Mod: 26,,, | Performed by: UROLOGY

## 2021-10-08 PROCEDURE — 71000033 HC RECOVERY, INTIAL HOUR: Performed by: UROLOGY

## 2021-10-08 PROCEDURE — 25000003 PHARM REV CODE 250: Performed by: UROLOGY

## 2021-10-08 PROCEDURE — 88300 SURGICAL PATH GROSS: CPT | Mod: 26,,, | Performed by: PATHOLOGY

## 2021-10-08 PROCEDURE — C1769 GUIDE WIRE: HCPCS | Performed by: UROLOGY

## 2021-10-08 PROCEDURE — C2617 STENT, NON-COR, TEM W/O DEL: HCPCS | Performed by: UROLOGY

## 2021-10-08 PROCEDURE — 36000706: Performed by: UROLOGY

## 2021-10-08 PROCEDURE — 37000009 HC ANESTHESIA EA ADD 15 MINS: Performed by: UROLOGY

## 2021-10-08 PROCEDURE — C1758 CATHETER, URETERAL: HCPCS | Performed by: UROLOGY

## 2021-10-08 PROCEDURE — D9220A PRA ANESTHESIA: ICD-10-PCS | Mod: CRNA,,, | Performed by: REGISTERED NURSE

## 2021-10-08 PROCEDURE — D9220A PRA ANESTHESIA: Mod: ANES,,, | Performed by: ANESTHESIOLOGY

## 2021-10-08 PROCEDURE — 63600175 PHARM REV CODE 636 W HCPCS: Performed by: REGISTERED NURSE

## 2021-10-08 PROCEDURE — 74420 UROGRAPHY RTRGR +-KUB: CPT | Mod: 26,,, | Performed by: UROLOGY

## 2021-10-08 PROCEDURE — 52332 PR CYSTOSCOPY,INSERT URETERAL STENT: ICD-10-PCS | Mod: 50,,, | Performed by: UROLOGY

## 2021-10-08 PROCEDURE — 52332 CYSTOSCOPY AND TREATMENT: CPT | Mod: 50,,, | Performed by: UROLOGY

## 2021-10-08 DEVICE — STENT URET PERCUFLEX 6FR 22CM: Type: IMPLANTABLE DEVICE | Site: URETER | Status: FUNCTIONAL

## 2021-10-08 DEVICE — STENT URET PERCUFLEX 6FR 24CM: Type: IMPLANTABLE DEVICE | Site: URETER | Status: FUNCTIONAL

## 2021-10-08 DEVICE — IMPLANTABLE DEVICE: Type: IMPLANTABLE DEVICE | Site: URETER | Status: FUNCTIONAL

## 2021-10-08 RX ORDER — ACETAMINOPHEN 500 MG
1000 TABLET ORAL ONCE
Status: COMPLETED | OUTPATIENT
Start: 2021-10-08 | End: 2021-10-08

## 2021-10-08 RX ORDER — ACETAMINOPHEN 325 MG/1
650 TABLET ORAL EVERY 4 HOURS PRN
Status: DISCONTINUED | OUTPATIENT
Start: 2021-10-08 | End: 2021-10-08 | Stop reason: HOSPADM

## 2021-10-08 RX ORDER — HYDROMORPHONE HYDROCHLORIDE 2 MG/ML
0.2 INJECTION, SOLUTION INTRAMUSCULAR; INTRAVENOUS; SUBCUTANEOUS EVERY 5 MIN PRN
Status: DISCONTINUED | OUTPATIENT
Start: 2021-10-08 | End: 2021-10-08 | Stop reason: HOSPADM

## 2021-10-08 RX ORDER — HYDROCODONE BITARTRATE AND ACETAMINOPHEN 5; 325 MG/1; MG/1
1 TABLET ORAL EVERY 4 HOURS PRN
Status: DISCONTINUED | OUTPATIENT
Start: 2021-10-08 | End: 2021-10-08 | Stop reason: HOSPADM

## 2021-10-08 RX ORDER — SODIUM CHLORIDE, SODIUM LACTATE, POTASSIUM CHLORIDE, CALCIUM CHLORIDE 600; 310; 30; 20 MG/100ML; MG/100ML; MG/100ML; MG/100ML
INJECTION, SOLUTION INTRAVENOUS CONTINUOUS
Status: DISCONTINUED | OUTPATIENT
Start: 2021-10-08 | End: 2021-10-08 | Stop reason: HOSPADM

## 2021-10-08 RX ORDER — FENTANYL CITRATE 50 UG/ML
INJECTION, SOLUTION INTRAMUSCULAR; INTRAVENOUS
Status: DISCONTINUED | OUTPATIENT
Start: 2021-10-08 | End: 2021-10-08

## 2021-10-08 RX ORDER — CEFAZOLIN SODIUM 2 G/50ML
2 SOLUTION INTRAVENOUS
Status: COMPLETED | OUTPATIENT
Start: 2021-10-08 | End: 2021-10-08

## 2021-10-08 RX ORDER — LIDOCAINE HYDROCHLORIDE 20 MG/ML
INJECTION INTRAVENOUS
Status: DISCONTINUED | OUTPATIENT
Start: 2021-10-08 | End: 2021-10-08

## 2021-10-08 RX ORDER — CEPHALEXIN 500 MG/1
500 CAPSULE ORAL EVERY 12 HOURS
Qty: 4 CAPSULE | Refills: 0 | Status: SHIPPED | OUTPATIENT
Start: 2021-10-08 | End: 2021-10-10

## 2021-10-08 RX ORDER — MIDAZOLAM HYDROCHLORIDE 1 MG/ML
INJECTION, SOLUTION INTRAMUSCULAR; INTRAVENOUS
Status: DISCONTINUED | OUTPATIENT
Start: 2021-10-08 | End: 2021-10-08

## 2021-10-08 RX ORDER — PHENYLEPHRINE HYDROCHLORIDE 10 MG/ML
INJECTION INTRAVENOUS
Status: DISCONTINUED | OUTPATIENT
Start: 2021-10-08 | End: 2021-10-08

## 2021-10-08 RX ORDER — PROPOFOL 10 MG/ML
VIAL (ML) INTRAVENOUS
Status: DISCONTINUED | OUTPATIENT
Start: 2021-10-08 | End: 2021-10-08

## 2021-10-08 RX ORDER — LIDOCAINE HYDROCHLORIDE 10 MG/ML
1 INJECTION, SOLUTION EPIDURAL; INFILTRATION; INTRACAUDAL; PERINEURAL ONCE
Status: DISCONTINUED | OUTPATIENT
Start: 2021-10-08 | End: 2021-10-08 | Stop reason: HOSPADM

## 2021-10-08 RX ORDER — SODIUM CHLORIDE 0.9 % (FLUSH) 0.9 %
10 SYRINGE (ML) INJECTION
Status: DISCONTINUED | OUTPATIENT
Start: 2021-10-08 | End: 2021-10-08 | Stop reason: HOSPADM

## 2021-10-08 RX ORDER — KETOROLAC TROMETHAMINE 10 MG/1
10 TABLET, FILM COATED ORAL EVERY 6 HOURS PRN
Qty: 6 TABLET | Refills: 0 | Status: SHIPPED | OUTPATIENT
Start: 2021-10-08 | End: 2021-10-13

## 2021-10-08 RX ORDER — DEXAMETHASONE SODIUM PHOSPHATE 4 MG/ML
INJECTION, SOLUTION INTRA-ARTICULAR; INTRALESIONAL; INTRAMUSCULAR; INTRAVENOUS; SOFT TISSUE
Status: DISCONTINUED | OUTPATIENT
Start: 2021-10-08 | End: 2021-10-08

## 2021-10-08 RX ORDER — PHENAZOPYRIDINE HYDROCHLORIDE 100 MG/1
200 TABLET, FILM COATED ORAL
Qty: 18 TABLET | Refills: 0 | Status: SHIPPED | OUTPATIENT
Start: 2021-10-08 | End: 2022-01-11

## 2021-10-08 RX ORDER — ONDANSETRON 2 MG/ML
INJECTION INTRAMUSCULAR; INTRAVENOUS
Status: DISCONTINUED | OUTPATIENT
Start: 2021-10-08 | End: 2021-10-08

## 2021-10-08 RX ADMIN — FENTANYL CITRATE 50 MCG: 50 INJECTION, SOLUTION INTRAMUSCULAR; INTRAVENOUS at 11:10

## 2021-10-08 RX ADMIN — DEXAMETHASONE SODIUM PHOSPHATE 4 MG: 4 INJECTION, SOLUTION INTRAMUSCULAR; INTRAVENOUS at 11:10

## 2021-10-08 RX ADMIN — SODIUM CHLORIDE, SODIUM LACTATE, POTASSIUM CHLORIDE, AND CALCIUM CHLORIDE: .6; .31; .03; .02 INJECTION, SOLUTION INTRAVENOUS at 09:10

## 2021-10-08 RX ADMIN — PHENYLEPHRINE HYDROCHLORIDE 100 MCG: 10 INJECTION INTRAVENOUS at 11:10

## 2021-10-08 RX ADMIN — PROPOFOL 150 MG: 10 INJECTION, EMULSION INTRAVENOUS at 11:10

## 2021-10-08 RX ADMIN — ACETAMINOPHEN 1000 MG: 500 TABLET ORAL at 09:10

## 2021-10-08 RX ADMIN — ONDANSETRON 4 MG: 2 INJECTION, SOLUTION INTRAMUSCULAR; INTRAVENOUS at 11:10

## 2021-10-08 RX ADMIN — HYDROCODONE BITARTRATE AND ACETAMINOPHEN 1 TABLET: 5; 325 TABLET ORAL at 01:10

## 2021-10-08 RX ADMIN — PHENYLEPHRINE HYDROCHLORIDE 200 MCG: 10 INJECTION INTRAVENOUS at 11:10

## 2021-10-08 RX ADMIN — CEFAZOLIN SODIUM 2 G: 2 SOLUTION INTRAVENOUS at 11:10

## 2021-10-08 RX ADMIN — LIDOCAINE HYDROCHLORIDE 100 MG: 20 INJECTION, SOLUTION INTRAVENOUS at 11:10

## 2021-10-08 RX ADMIN — MIDAZOLAM HYDROCHLORIDE 2 MG: 1 INJECTION, SOLUTION INTRAMUSCULAR; INTRAVENOUS at 10:10

## 2021-10-11 LAB — POCT GLUCOSE: 108 MG/DL (ref 70–110)

## 2021-10-12 LAB
FINAL PATHOLOGIC DIAGNOSIS: NORMAL
GROSS: NORMAL
Lab: NORMAL

## 2021-12-07 ENCOUNTER — TELEPHONE (OUTPATIENT)
Dept: UROLOGY | Facility: CLINIC | Age: 56
End: 2021-12-07
Payer: MEDICAID

## 2022-01-11 ENCOUNTER — OFFICE VISIT (OUTPATIENT)
Dept: UROLOGY | Facility: CLINIC | Age: 57
End: 2022-01-11
Payer: MEDICAID

## 2022-01-11 VITALS — WEIGHT: 144.38 LBS | HEIGHT: 69 IN | BODY MASS INDEX: 21.38 KG/M2

## 2022-01-11 DIAGNOSIS — R39.89 SUSPECTED UTI: Primary | ICD-10-CM

## 2022-01-11 DIAGNOSIS — Z96.0 RETAINED URETERAL STENT: ICD-10-CM

## 2022-01-11 DIAGNOSIS — K68.2 RETROPERITONEAL FIBROSIS: ICD-10-CM

## 2022-01-11 DIAGNOSIS — N13.5 URETERAL STRICTURE: ICD-10-CM

## 2022-01-11 DIAGNOSIS — N30.10 INTERSTITIAL CYSTITIS: ICD-10-CM

## 2022-01-11 DIAGNOSIS — N94.10 DYSPAREUNIA, FEMALE: ICD-10-CM

## 2022-01-11 PROCEDURE — 99213 OFFICE O/P EST LOW 20 MIN: CPT | Mod: PBBFAC | Performed by: UROLOGY

## 2022-01-11 PROCEDURE — 99214 PR OFFICE/OUTPT VISIT, EST, LEVL IV, 30-39 MIN: ICD-10-PCS | Mod: S$PBB,,, | Performed by: UROLOGY

## 2022-01-11 PROCEDURE — 1160F PR REVIEW ALL MEDS BY PRESCRIBER/CLIN PHARMACIST DOCUMENTED: ICD-10-PCS | Mod: CPTII,,, | Performed by: UROLOGY

## 2022-01-11 PROCEDURE — 99214 OFFICE O/P EST MOD 30 MIN: CPT | Mod: S$PBB,,, | Performed by: UROLOGY

## 2022-01-11 PROCEDURE — 1159F MED LIST DOCD IN RCRD: CPT | Mod: CPTII,,, | Performed by: UROLOGY

## 2022-01-11 PROCEDURE — 99999 PR PBB SHADOW E&M-EST. PATIENT-LVL III: ICD-10-PCS | Mod: PBBFAC,,, | Performed by: UROLOGY

## 2022-01-11 PROCEDURE — 87086 URINE CULTURE/COLONY COUNT: CPT | Performed by: UROLOGY

## 2022-01-11 PROCEDURE — 3008F BODY MASS INDEX DOCD: CPT | Mod: CPTII,,, | Performed by: UROLOGY

## 2022-01-11 PROCEDURE — 1160F RVW MEDS BY RX/DR IN RCRD: CPT | Mod: CPTII,,, | Performed by: UROLOGY

## 2022-01-11 PROCEDURE — 3008F PR BODY MASS INDEX (BMI) DOCUMENTED: ICD-10-PCS | Mod: CPTII,,, | Performed by: UROLOGY

## 2022-01-11 PROCEDURE — 99999 PR PBB SHADOW E&M-EST. PATIENT-LVL III: CPT | Mod: PBBFAC,,, | Performed by: UROLOGY

## 2022-01-11 PROCEDURE — 1159F PR MEDICATION LIST DOCUMENTED IN MEDICAL RECORD: ICD-10-PCS | Mod: CPTII,,, | Performed by: UROLOGY

## 2022-01-11 RX ORDER — ESTRADIOL 0.1 MG/G
1 CREAM VAGINAL
Qty: 42.5 G | Refills: 11 | Status: SHIPPED | OUTPATIENT
Start: 2022-01-13 | End: 2023-04-28

## 2022-01-11 NOTE — PROGRESS NOTES
Subjective:       Lizy Jimenez is a 56 y.o. female who is an established patient of Dr. Zaragoza was seen for evaluation of RPF.      She was last seen 12/15 by [facility]. 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 Also with diagnosis of IC (given Elmiron by [facility]). She was managed with indwelling stents that were changes q3mths since 2005. Stents were changed by [facility] 11/15. After that, she established cared with Dr Smith at .     She has not been seen by this practice in almost 2 years. Last stent exchange by [facility] in 11/15 required R URS due to displaced stent (difficult to interpret op note). She reports last stent exchange by Dr Smith was in 7/16 - she is VERY overdue for stent exchange. She did not bring any records from Dr Smith.     In review of her notes, it is very difficult to understand the etiology/workup of her strictures/RPF. She is s/p XRT for cervical cancer. She states that the stents were in place prior to XRT and were done due to kidney stones. She was offered reimplantation but she did not want to do this due to down time from surgery.      Stent exchange (uncomplicated) on 11/17/17. R - 6Fr x 22cm. LUP - 6Fr x 22cm, LLP - 6Fr x 20cm     8/27/2019  She was lost to follow up again and eventually had stents exchanged 7/19/19 without much difficulty. Prior stent exchange was 11/17. Complete L ureteral duplication.     7/27/2020  Again lost to follow up for stent exchange. One year since last exchange (7/19/19). Now with UTI symptoms. Two stents on L due to duplication.    9/13/2021  Yet again, lost to follow up for stent exchange. Last done 8/14/20. Now with dysuria. Still declines definitive reimplantation/ureterolysis surgery or further workup.     1/11/2022  Occasional bladder spasms and pain in SP area.       R - 6Fr x 22cm JJ stent.   L UP - 6Fr x 24cm JJ stent.   L LP - 6Fr x 20cm JJ stent.       The  "following portions of the patient's history were reviewed and updated as appropriate: allergies, current medications, past family history, past medical history, past social history, past surgical history and problem list.    Review of Systems  Constitutional: no fever or chills  ENT: no nasal congestion or sore throat  Respiratory: no cough or shortness of breath  Cardiovascular: no chest pain or palpitations  Gastrointestinal: no nausea or vomiting, tolerating diet  Genitourinary: as per HPI  Hematologic/Lymphatic: no easy bruising or lymphadenopathy  Musculoskeletal: no arthralgias or myalgias  Skin: no rashes or lesions  Neurological: no seizures or tremors  Behavioral/Psych: no auditory or visual hallucinations        Objective:    Vitals: Ht 5' 9" (1.753 m)   Wt 65.5 kg (144 lb 6.4 oz)   BMI 21.32 kg/m²     Physical Exam   General: well developed, well nourished in no acute distress  Head: normocephalic, atraumatic  Neck: supple, trachea midline, no obvious enlargement of thyroid  HEENT: EOMI, mucus membranes moist, sclera anicteric, no hearing impairment  Lungs: symmetric expansion, non-labored breathing  Skin: no rashes or lesions  Neuro: alert and oriented x 3, no gross deficits  Psych: normal judgment and insight, normal mood/affect and non-anxious  Genitourinary:   patient declined exam      Lab Review   Urine analysis today in clinic shows positive for nitrites, leukocytes, 100 protein, 250 glucose, 5-10 RBCs    Lab Results   Component Value Date    WBC 7.06 10/05/2021    HGB 11.8 (L) 10/05/2021    HCT 35.5 (L) 10/05/2021    MCV 97 10/05/2021     10/05/2021     Lab Results   Component Value Date    CREATININE 1.0 10/05/2021    BUN 17 10/05/2021       Imaging  Images and reports were personally reviewed by me and discussed with patient         Assessment/Plan:      1. Retained ureteral stent    - Stent exchange delayed >1 year   - Stents exchanged by myself on 11/17/17 without complication despite " prolonged indwelling time. Minimal encrustation. Similar procedure done 7/19 with prolonged indwelling time.    - Discussed referral to Dr Santacruz for definitive repair   - Complete duplication on L   - Stents exchanged 10/8/21     2. Retroperitoneal fibrosis    - Unsure how this was diagnosed     3. Ureteral stricture    - Due to stones or XRT - unsure at this time     4. Interstitial cystitis    - Unsure diagnosis   - Was on Elmiron       5. Bladder pain    - UCx today    6. Dyspareunia   - Suspect related to XRT   - Estrace 2x weekly   - Discussed PFPT      Follow up in 3-4 months to arrange stent exchange

## 2022-01-13 ENCOUNTER — TELEPHONE (OUTPATIENT)
Dept: UROLOGY | Facility: CLINIC | Age: 57
End: 2022-01-13
Payer: MEDICAID

## 2022-01-13 LAB — BACTERIA UR CULT: NORMAL

## 2022-01-13 NOTE — TELEPHONE ENCOUNTER
Spoke to pt advised urine culture negative for infection no antibiotics needed at this time.sirena

## 2022-05-31 ENCOUNTER — TELEPHONE (OUTPATIENT)
Dept: EMERGENCY MEDICINE | Facility: HOSPITAL | Age: 57
End: 2022-05-31
Payer: MEDICAID

## 2022-05-31 ENCOUNTER — HOSPITAL ENCOUNTER (EMERGENCY)
Facility: HOSPITAL | Age: 57
Discharge: HOME | End: 2022-05-31
Attending: EMERGENCY MEDICINE
Payer: MEDICAID

## 2022-05-31 VITALS
HEIGHT: 69 IN | RESPIRATION RATE: 18 BRPM | SYSTOLIC BLOOD PRESSURE: 125 MMHG | BODY MASS INDEX: 20.73 KG/M2 | WEIGHT: 140 LBS | HEART RATE: 84 BPM | TEMPERATURE: 99 F | OXYGEN SATURATION: 99 % | DIASTOLIC BLOOD PRESSURE: 71 MMHG

## 2022-05-31 DIAGNOSIS — B02.9 HERPES ZOSTER WITHOUT COMPLICATION: Primary | ICD-10-CM

## 2022-05-31 PROCEDURE — 25000003 PHARM REV CODE 250: Performed by: NURSE PRACTITIONER

## 2022-05-31 PROCEDURE — 99284 EMERGENCY DEPT VISIT MOD MDM: CPT

## 2022-05-31 RX ORDER — LIDOCAINE 50 MG/G
1 PATCH TOPICAL
Status: DISCONTINUED | OUTPATIENT
Start: 2022-05-31 | End: 2022-05-31 | Stop reason: HOSPADM

## 2022-05-31 RX ORDER — VALACYCLOVIR HYDROCHLORIDE 500 MG/1
1000 TABLET, FILM COATED ORAL
Status: COMPLETED | OUTPATIENT
Start: 2022-05-31 | End: 2022-05-31

## 2022-05-31 RX ORDER — HYDROCODONE BITARTRATE AND ACETAMINOPHEN 5; 325 MG/1; MG/1
1 TABLET ORAL EVERY 6 HOURS PRN
Qty: 12 TABLET | Refills: 0 | Status: SHIPPED | OUTPATIENT
Start: 2022-05-31 | End: 2023-04-28 | Stop reason: CLARIF

## 2022-05-31 RX ORDER — VALACYCLOVIR HYDROCHLORIDE 1 G/1
1000 TABLET, FILM COATED ORAL 3 TIMES DAILY
Qty: 21 TABLET | Refills: 0 | Status: SHIPPED | OUTPATIENT
Start: 2022-05-31 | End: 2022-06-07

## 2022-05-31 RX ADMIN — LIDOCAINE 1 PATCH: 50 PATCH TOPICAL at 02:05

## 2022-05-31 RX ADMIN — VALACYCLOVIR HYDROCHLORIDE 1000 MG: 500 TABLET, FILM COATED ORAL at 02:05

## 2022-05-31 NOTE — ED TRIAGE NOTES
Pt. reports she has a rash on the right side of her abd and above her umbilicus. Pt. Is noted with cluster to the areas and reports pain to site.

## 2022-05-31 NOTE — ED PROVIDER NOTES
"Encounter Date: 5/31/2022    SCRIBE #1 NOTE: I, Abran Jordan, am scribing for, and in the presence of,  Arnoldo Jung DNP. I have scribed the following portions of the note - Other sections scribed: HPI, ROS, PE.       History     Chief Complaint   Patient presents with    Rash     Presents to the ED with c/o blistered rash to midline abdomen and R side of ribs. Reports burning pain.      Time seen by the provider: 2:15 PM  Lizy Jimenez is a 56 y.o. female, with a PMHx of DM, HTN and Cancer, who presents to the ED for evaluation of a painful, blistering rash to her central abdomen that began one week ago. Describes pain as "burning" and 7/10 in severity. No other exacerbating or alleviating factors. Patient denies appearance of rash to any other part of her body. Denies any other associated symptoms.       The history is provided by the patient. No  was used.     Review of patient's allergies indicates:  No Known Allergies  Past Medical History:   Diagnosis Date    Cancer     cervical--s/p radiation tx and chemo    Diabetes mellitus     Hydronephrosis     Hypertension     Interstitial cystitis     Nocturia     Vaginal delivery     x1     Past Surgical History:   Procedure Laterality Date    CYSTOSCOPY WITH URETEROSCOPY, RETROGRADE PYELOGRAPHY, AND INSERTION OF STENT Bilateral 10/8/2021    Procedure: CYSTOSCOPY, WITH RETROGRADE PYELOGRAM AND URETERAL STENT INSERTION;  Surgeon: Dena Paula MD;  Location: [facility] OR;  Service: Urology;  Laterality: Bilateral;  RN Pre Op, Covid NEGATIVE ON  10-5-21.  [place]    CYSTOSTOMY W/ STENT INSERTION      multiple episodes since 2005    CYSTOURETEROSCOPY WITH RETROGRADE PYELOGRAPHY AND INSERTION OF STENT INTO URETER Bilateral 8/14/2020    Procedure: CYSTOURETEROSCOPY, WITH RETROGRADE PYELOGRAM AND URETERAL STENT INSERTION - bilateral stent exchange;  Surgeon: Dena Paula MD;  Location: [facility] OR;  Service: Urology;  Laterality: " Bilateral;  RN PREOP 8/10/2020--COVID NEGATIVE ON 8/11    ENDOMETRIAL ABLATION      REPLACEMENT OF STENT Bilateral 7/19/2019    Procedure: REPLACEMENT, STENT; cystoscopy, retrograde pyelogram;  Surgeon: Dena Paula MD;  [facility]: [facility];  Service: Urology;  Laterality: Bilateral;  RN PRE OP 7-12-19     History reviewed. No pertinent family history.  Social History     Tobacco Use    Smoking status: Never Smoker    Smokeless tobacco: Never Used   Substance Use Topics    Alcohol use: Yes     Comment: social    Drug use: Never     Review of Systems   Constitutional: Negative for chills, fatigue and fever.   HENT: Negative for congestion, ear discharge, ear pain, postnasal drip, rhinorrhea, sinus pressure, sneezing, sore throat and voice change.    Eyes: Negative for discharge and itching.   Respiratory: Negative for cough, shortness of breath and wheezing.    Cardiovascular: Negative for chest pain, palpitations and leg swelling.   Gastrointestinal: Negative for abdominal pain, constipation, diarrhea, nausea and vomiting.   Endocrine: Negative for polydipsia, polyphagia and polyuria.   Genitourinary: Negative for dysuria, frequency, hematuria, urgency, vaginal bleeding, vaginal discharge and vaginal pain.   Musculoskeletal: Negative for arthralgias and myalgias.   Skin: Positive for rash. Negative for wound.   Neurological: Negative for dizziness, seizures, syncope, weakness and numbness.   Hematological: Negative for adenopathy. Does not bruise/bleed easily.   Psychiatric/Behavioral: Negative for self-injury and suicidal ideas. The patient is not nervous/anxious.        Physical Exam     Initial Vitals [05/31/22 1356]   BP Pulse Resp Temp SpO2   125/71 84 18 99 °F (37.2 °C) 99 %      MAP       --         Physical Exam    Nursing note and vitals reviewed.  Constitutional: She appears well-developed and well-nourished.   HENT:   Head: Normocephalic and atraumatic.   Right Ear: External ear normal.    Left Ear: External ear normal.   Nose: Nose normal.   Eyes: Conjunctivae and EOM are normal. Pupils are equal, round, and reactive to light. Right eye exhibits no discharge. Left eye exhibits no discharge.   Neck:   Normal range of motion.  Abdominal: Abdomen is soft. Bowel sounds are normal. She exhibits no distension. There is no abdominal tenderness.   Musculoskeletal:         General: Normal range of motion.      Cervical back: Normal range of motion.     Neurological: She is alert and oriented to person, place, and time.   Skin: Skin is dry. Capillary refill takes less than 2 seconds.   2 groupings of vesicles on erythematous bases on abdomen.         ED Course   Procedures  Labs Reviewed - No data to display       Imaging Results    None          Medications   valACYclovir tablet 1,000 mg (1,000 mg Oral Given 5/31/22 1421)     Medical Decision Making:   History:   Old Medical Records: I decided to obtain old medical records.  Initial Assessment:   Rash consistent with herpes zoster to the right mid abdomen in a dermatomal distribution.  Differential Diagnosis:   Herpes zoster, other viral rash  ED Management:  Patient was started on valacyclovir, pain medication provided.  Topical lidocaine used in the emergency department.    See AVS for additional recommendations. Medications listed herein were prescribed after reviewing the patient's allergies, medication list, history, most recent laboratories as available.  Referrals below were provided after reviewing the patient's previous medical providers. She understands she  should return for any worsening or changes in condition.  Prior to discharge the patient was asked if she  had any additional concerns or complaints and she declined. The patient was given an opportunity to ask questions and all were answered to her satisfaction.            Scribe Attestation:   Scribe #1: I performed the above scribed service and the documentation accurately describes the  services I performed. I attest to the accuracy of the note.        ED Course as of 05/31/22 2132 Tue May 31, 2022   1413 BP: 125/71 [VC]   1413 Temp: 99 °F (37.2 °C) [VC]   1413 Temp src: Oral [VC]   1413 Pulse: 84 [VC]   1413 Resp: 18 [VC]   1413 SpO2: 99 % [VC]      ED Course User Index  [VC] Arnoldo Jung DNP             Clinical Impression:   Final diagnoses:  [B02.9] Herpes zoster without complication (Primary)       I, Arnoldo Jung DNP ACNP-BC FNP-C ENP-C  , personally performed the services described in this documentation. All medical record entries made by the scribe were at my direction and in my presence. I have reviewed the chart and agree that the record reflects my personal performance and is accurate and complete.     ED Disposition Condition    Discharge Stable        ED Prescriptions     Medication Sig Dispense Start Date End Date Auth. Provider    HYDROcodone-acetaminophen (NORCO) 5-325 mg per tablet Take 1 tablet by mouth every 6 (six) hours as needed for Pain. 12 tablet 5/31/2022  Arnoldo Jung DNP    valACYclovir (VALTREX) 1000 MG tablet Take 1 tablet (1,000 mg total) by mouth 3 (three) times daily. for 7 days 21 tablet 5/31/2022 6/7/2022 Arnoldo Jung DNP        Follow-up Information     Follow up With Specialties Details Why Contact Info    Janel Burger NP Family Medicine Schedule an appointment as soon as possible for a visit  As needed [address] [facility] [address]  [phone]             Arnoldo Jung DNP  05/31/22 2132

## 2023-02-27 ENCOUNTER — TELEPHONE (OUTPATIENT)
Dept: UROLOGY | Facility: CLINIC | Age: 58
End: 2023-02-27
Payer: MEDICAID

## 2023-02-27 NOTE — TELEPHONE ENCOUNTER
LM for pt that I scheduled her appt for 4/6/23 @ 11:20am with Dr. Paula        ----- Message from Deborah Estevez sent at 2/27/2023 12:24 PM CST -----  Regarding: patient call back  Type: Patient Call Back    Who called: Self     What is the request in detail: I'm only seeing afternoon apts to schedule. She would like a call for a 10 or 11 am apt.     Can the clinic reply by [facility]? No     Would the patient rather a call back or a response via [facility]? Call     Best call back number: .[phone]

## 2023-04-06 ENCOUNTER — OFFICE VISIT (OUTPATIENT)
Dept: UROLOGY | Facility: CLINIC | Age: 58
End: 2023-04-06
Payer: MEDICAID

## 2023-04-06 ENCOUNTER — ANESTHESIA EVENT (OUTPATIENT)
Dept: SURGERY | Facility: HOSPITAL | Age: 58
End: 2023-04-06
Payer: MEDICAID

## 2023-04-06 VITALS — WEIGHT: 143.75 LBS | BODY MASS INDEX: 21.23 KG/M2

## 2023-04-06 DIAGNOSIS — Z96.0 RETAINED URETERAL STENT: Primary | ICD-10-CM

## 2023-04-06 DIAGNOSIS — N13.5 OBSTRUCTION OF BOTH URETERS: ICD-10-CM

## 2023-04-06 DIAGNOSIS — R39.89 SUSPECTED UTI: ICD-10-CM

## 2023-04-06 DIAGNOSIS — N13.5 URETERAL STRICTURE: ICD-10-CM

## 2023-04-06 PROCEDURE — 99999 PR PBB SHADOW E&M-EST. PATIENT-LVL IV: CPT | Mod: PBBFAC,,, | Performed by: UROLOGY

## 2023-04-06 PROCEDURE — 3008F PR BODY MASS INDEX (BMI) DOCUMENTED: ICD-10-PCS | Mod: CPTII,,, | Performed by: UROLOGY

## 2023-04-06 PROCEDURE — 99214 OFFICE O/P EST MOD 30 MIN: CPT | Mod: PBBFAC | Performed by: UROLOGY

## 2023-04-06 PROCEDURE — 1160F RVW MEDS BY RX/DR IN RCRD: CPT | Mod: CPTII,,, | Performed by: UROLOGY

## 2023-04-06 PROCEDURE — 4010F ACE/ARB THERAPY RXD/TAKEN: CPT | Mod: CPTII,,, | Performed by: UROLOGY

## 2023-04-06 PROCEDURE — 99999 PR PBB SHADOW E&M-EST. PATIENT-LVL IV: ICD-10-PCS | Mod: PBBFAC,,, | Performed by: UROLOGY

## 2023-04-06 PROCEDURE — 4010F PR ACE/ARB THEARPY RXD/TAKEN: ICD-10-PCS | Mod: CPTII,,, | Performed by: UROLOGY

## 2023-04-06 PROCEDURE — 99214 OFFICE O/P EST MOD 30 MIN: CPT | Mod: S$PBB,,, | Performed by: UROLOGY

## 2023-04-06 PROCEDURE — 1159F PR MEDICATION LIST DOCUMENTED IN MEDICAL RECORD: ICD-10-PCS | Mod: CPTII,,, | Performed by: UROLOGY

## 2023-04-06 PROCEDURE — 99214 PR OFFICE/OUTPT VISIT, EST, LEVL IV, 30-39 MIN: ICD-10-PCS | Mod: S$PBB,,, | Performed by: UROLOGY

## 2023-04-06 PROCEDURE — 1159F MED LIST DOCD IN RCRD: CPT | Mod: CPTII,,, | Performed by: UROLOGY

## 2023-04-06 PROCEDURE — 3008F BODY MASS INDEX DOCD: CPT | Mod: CPTII,,, | Performed by: UROLOGY

## 2023-04-06 PROCEDURE — 87086 URINE CULTURE/COLONY COUNT: CPT | Performed by: UROLOGY

## 2023-04-06 PROCEDURE — 1160F PR REVIEW ALL MEDS BY PRESCRIBER/CLIN PHARMACIST DOCUMENTED: ICD-10-PCS | Mod: CPTII,,, | Performed by: UROLOGY

## 2023-04-06 NOTE — H&P
Subjective:       Lizy Jimenez is a 57 y.o. female who is an established patient of Dr. Zaragoza was seen for evaluation of RPF.      She was last seen 12/15 by [facility]. 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 Also with diagnosis of IC (given Elmiron by [facility]CA). She was managed with indwelling stents that were changes q3mths since 2005. Stents were changed by [facility] 11/15. After that, she established cared with Dr Smith at .     She has not been seen by this practice in almost 2 years. Last stent exchange by [facility] in 11/15 required R URS due to displaced stent (difficult to interpret op note). She reports last stent exchange by Dr Smith was in 7/16 - she is VERY overdue for stent exchange. She did not bring any records from Dr Smith.     In review of her notes, it is very difficult to understand the etiology/workup of her strictures/RPF. She is s/p XRT for cervical cancer. She states that the stents were in place prior to XRT and were done due to kidney stones. She was offered reimplantation but she did not want to do this due to down time from surgery.      Stent exchange (uncomplicated) on 11/17/17. R - 6Fr x 22cm. LUP - 6Fr x 22cm, LLP - 6Fr x 20cm     8/27/2019  She was lost to follow up again and eventually had stents exchanged 7/19/19 without much difficulty. Prior stent exchange was 11/17. Complete L ureteral duplication.     7/27/2020  Again lost to follow up for stent exchange. One year since last exchange (7/19/19). Now with UTI symptoms. Two stents on L due to duplication.    9/13/2021  Yet again, lost to follow up for stent exchange. Last done 8/14/20. Now with dysuria. Still declines definitive reimplantation/ureterolysis surgery or further workup.     1/11/2022  Occasional bladder spasms and pain in SP area.     4/6/2023  Here to arrange stent exchange. Last done 10/8/21. Denies stent related issues.        R - 6Fr  x 22cm JJ stent.   L UP - 6Fr x 24cm JJ stent.   L LP - 6Fr x 20cm JJ stent.       The following portions of the patient's history were reviewed and updated as appropriate: allergies, current medications, past family history, past medical history, past social history, past surgical history and problem list.    Review of Systems  Constitutional: no fever or chills  ENT: no nasal congestion or sore throat  Respiratory: no cough or shortness of breath  Cardiovascular: no chest pain or palpitations  Gastrointestinal: no nausea or vomiting, tolerating diet  Genitourinary: as per HPI  Hematologic/Lymphatic: no easy bruising or lymphadenopathy  Musculoskeletal: no arthralgias or myalgias  Skin: no rashes or lesions  Neurological: no seizures or tremors  Behavioral/Psych: no auditory or visual hallucinations        Objective:    Vitals: Wt 65.2 kg (143 lb 11.8 oz)   BMI 21.23 kg/m²     Physical Exam   General: well developed, well nourished in no acute distress  Head: normocephalic, atraumatic  Neck: supple, trachea midline, no obvious enlargement of thyroid  HEENT: EOMI, mucus membranes moist, sclera anicteric, no hearing impairment  Lungs: symmetric expansion, non-labored breathing  Skin: no rashes or lesions  Neuro: alert and oriented x 3, no gross deficits  Psych: normal judgment and insight, normal mood/affect and non-anxious  Genitourinary:   patient declined exam      Lab Review   Urine analysis today in clinic shows positive for nitrites, leukocytes, 100 protein, 250 glucose, 5-10 RBCs    Lab Results   Component Value Date    WBC 7.06 10/05/2021    HGB 11.8 (L) 10/05/2021    HCT 35.5 (L) 10/05/2021    MCV 97 10/05/2021     10/05/2021     Lab Results   Component Value Date    CREATININE 1.0 10/05/2021    BUN 17 10/05/2021       Imaging  Images and reports were personally reviewed by me and discussed with patient         Assessment/Plan:      1. Retained ureteral stent    - Stent exchange delayed >1 year   -  Stents exchanged by myself on 11/17/17 without complication despite prolonged indwelling time. Minimal encrustation. Similar procedure done 7/19 with prolonged indwelling time.    - Discussed referral to Dr Santacruz for definitive repair   - Complete duplication on L   - Stents exchanged 10/8/21   - Overdue for exchange. OR 5/5/23. UCx today.      2. Retroperitoneal fibrosis    - Unsure how this was diagnosed     3. Ureteral stricture    - Due to stones or XRT - unsure at this time     4. Interstitial cystitis    - Unsure diagnosis   - Was on Elmiron       5. Bladder pain    - UCx today    6. Dyspareunia   - Suspect related to XRT   - Estrace 2x weekly   - Discussed PFPT      Follow up in 6 weeks post-op

## 2023-04-08 LAB — BACTERIA UR CULT: NORMAL

## 2023-04-28 ENCOUNTER — HOSPITAL ENCOUNTER (OUTPATIENT)
Dept: PREADMISSION TESTING | Facility: HOSPITAL | Age: 58
Discharge: HOME | End: 2023-04-28
Attending: UROLOGY
Payer: MEDICAID

## 2023-04-28 VITALS
BODY MASS INDEX: 22.11 KG/M2 | DIASTOLIC BLOOD PRESSURE: 69 MMHG | TEMPERATURE: 98 F | HEART RATE: 65 BPM | OXYGEN SATURATION: 100 % | RESPIRATION RATE: 18 BRPM | HEIGHT: 69 IN | SYSTOLIC BLOOD PRESSURE: 107 MMHG | WEIGHT: 149.25 LBS

## 2023-04-28 DIAGNOSIS — Z01.818 PREOP TESTING: Primary | ICD-10-CM

## 2023-04-28 LAB
ALBUMIN SERPL BCP-MCNC: 3.6 G/DL (ref 3.5–5.2)
ALP SERPL-CCNC: 80 U/L (ref 55–135)
ALT SERPL W/O P-5'-P-CCNC: 9 U/L (ref 10–44)
ANION GAP SERPL CALC-SCNC: 6 MMOL/L (ref 8–16)
AST SERPL-CCNC: 15 U/L (ref 10–40)
BASOPHILS # BLD AUTO: 0.04 K/UL (ref 0–0.2)
BASOPHILS NFR BLD: 0.6 % (ref 0–1.9)
BILIRUB SERPL-MCNC: 0.5 MG/DL (ref 0.1–1)
BUN SERPL-MCNC: 11 MG/DL (ref 6–20)
CALCIUM SERPL-MCNC: 9.5 MG/DL (ref 8.7–10.5)
CHLORIDE SERPL-SCNC: 104 MMOL/L (ref 95–110)
CO2 SERPL-SCNC: 27 MMOL/L (ref 23–29)
CREAT SERPL-MCNC: 0.9 MG/DL (ref 0.5–1.4)
DIFFERENTIAL METHOD: ABNORMAL
EOSINOPHIL # BLD AUTO: 0.1 K/UL (ref 0–0.5)
EOSINOPHIL NFR BLD: 1.1 % (ref 0–8)
ERYTHROCYTE [DISTWIDTH] IN BLOOD BY AUTOMATED COUNT: 11.5 % (ref 11.5–14.5)
EST. GFR  (NO RACE VARIABLE): >60 ML/MIN/1.73 M^2
GLUCOSE SERPL-MCNC: 115 MG/DL (ref 70–110)
HCT VFR BLD AUTO: 33.8 % (ref 37–48.5)
HGB BLD-MCNC: 11 G/DL (ref 12–16)
IMM GRANULOCYTES # BLD AUTO: 0 K/UL (ref 0–0.04)
IMM GRANULOCYTES NFR BLD AUTO: 0 % (ref 0–0.5)
LYMPHOCYTES # BLD AUTO: 2.7 K/UL (ref 1–4.8)
LYMPHOCYTES NFR BLD: 41.1 % (ref 18–48)
MCH RBC QN AUTO: 30.9 PG (ref 27–31)
MCHC RBC AUTO-ENTMCNC: 32.5 G/DL (ref 32–36)
MCV RBC AUTO: 95 FL (ref 82–98)
MONOCYTES # BLD AUTO: 0.6 K/UL (ref 0.3–1)
MONOCYTES NFR BLD: 9.8 % (ref 4–15)
NEUTROPHILS # BLD AUTO: 3.1 K/UL (ref 1.8–7.7)
NEUTROPHILS NFR BLD: 47.4 % (ref 38–73)
NRBC BLD-RTO: 0 /100 WBC
PLATELET # BLD AUTO: 331 K/UL (ref 150–450)
PMV BLD AUTO: 9.5 FL (ref 9.2–12.9)
POTASSIUM SERPL-SCNC: 4.4 MMOL/L (ref 3.5–5.1)
PROT SERPL-MCNC: 7 G/DL (ref 6–8.4)
RBC # BLD AUTO: 3.56 M/UL (ref 4–5.4)
SODIUM SERPL-SCNC: 137 MMOL/L (ref 136–145)
WBC # BLD AUTO: 6.55 K/UL (ref 3.9–12.7)

## 2023-04-28 PROCEDURE — 93010 EKG 12-LEAD: ICD-10-PCS | Mod: ,,, | Performed by: INTERNAL MEDICINE

## 2023-04-28 PROCEDURE — 80053 COMPREHEN METABOLIC PANEL: CPT | Performed by: UROLOGY

## 2023-04-28 PROCEDURE — 85025 COMPLETE CBC W/AUTO DIFF WBC: CPT | Performed by: UROLOGY

## 2023-04-28 PROCEDURE — 93010 ELECTROCARDIOGRAM REPORT: CPT | Mod: ,,, | Performed by: INTERNAL MEDICINE

## 2023-04-28 PROCEDURE — 93005 ELECTROCARDIOGRAM TRACING: CPT

## 2023-04-28 NOTE — ANESTHESIA PREPROCEDURE EVALUATION
04/28/2023  Lizy Jimenez is a 57 y.o., female scheduled for CYSTOURETEROSCOPY, WITH RETROGRADE PYELOGRAM AND URETERAL STENT INSERTION (Bilateral) on 5/5/2023.      Past Medical History:   Diagnosis Date    Cancer     cervical--s/p radiation tx and chemo    Diabetes mellitus     Hydronephrosis     Hypertension     Interstitial cystitis     Nocturia     Vaginal delivery     x1       Past Surgical History:   Procedure Laterality Date    CYSTOSCOPY WITH URETEROSCOPY, RETROGRADE PYELOGRAPHY, AND INSERTION OF STENT Bilateral 10/8/2021    Procedure: CYSTOSCOPY, WITH RETROGRADE PYELOGRAM AND URETERAL STENT INSERTION;  Surgeon: Dena Paula MD;  Location: [facility] OR;  Service: Urology;  Laterality: Bilateral;  RN Pre Op, Covid NEGATIVE ON  10-5-21.  [place]    CYSTOSTOMY W/ STENT INSERTION      multiple episodes since 2005    CYSTOURETEROSCOPY WITH RETROGRADE PYELOGRAPHY AND INSERTION OF STENT INTO URETER Bilateral 8/14/2020    Procedure: CYSTOURETEROSCOPY, WITH RETROGRADE PYELOGRAM AND URETERAL STENT INSERTION - bilateral stent exchange;  Surgeon: Dena Paula MD;  Location: [facility] OR;  Service: Urology;  Laterality: Bilateral;  RN PREOP 8/10/2020--COVID NEGATIVE ON 8/11    ENDOMETRIAL ABLATION      REPLACEMENT OF STENT Bilateral 7/19/2019    Procedure: REPLACEMENT, STENT; cystoscopy, retrograde pyelogram;  Surgeon: Dena Paula MD;  Location: [facility] OR;  Service: Urology;  Laterality: Bilateral;  RN PRE OP 7-12-19         Pre-op Assessment    I have reviewed the Patient Summary Reports.     I have reviewed the Nursing Notes. I have reviewed the NPO Status.   I have reviewed the Medications.     Review of Systems  Anesthesia Hx:  No problems with previous Anesthesia  Denies Family Hx of Anesthesia complications.   Denies Personal Hx of Anesthesia complications.   Social:  Non-Smoker,  Social Alcohol Use    Hematology/Oncology:  Hematology Normal   Oncology Normal     EENT/Dental:EENT/Dental Normal   Cardiovascular:   Exercise tolerance: good Hypertension  Functional Capacity good / => 4 METS    Pulmonary:  Pulmonary Normal    Renal/:   Chronic Renal Disease    Hepatic/GI:  Hepatic/GI Normal    Musculoskeletal:  Musculoskeletal Normal    Neurological:  Neurology Normal    Endocrine:   Diabetes, type 2    Dermatological:  Skin Normal    Psych:  Psychiatric Normal           Physical Exam  General: Well nourished, Cooperative, Alert and Oriented    Airway:  Mallampati: II   Mouth Opening: Normal  TM Distance: Normal  Tongue: Normal  Neck ROM: Normal ROM    Dental:  Partial Dentures  Partial upper      Anesthesia Plan  Type of Anesthesia, risks & benefits discussed:    Anesthesia Type: MAC  Intra-op Monitoring Plan: Standard ASA Monitors  Induction:  IV  Informed Consent: Informed consent signed with the Patient and all parties understand the risks and agree with anesthesia plan.  All questions answered. Patient consented to blood products? No  ASA Score: 2    Ready For Surgery From Anesthesia Perspective.     .

## 2023-04-28 NOTE — DISCHARGE INSTRUCTIONS

## 2023-05-05 ENCOUNTER — ANESTHESIA (OUTPATIENT)
Dept: SURGERY | Facility: HOSPITAL | Age: 58
End: 2023-05-05
Payer: MEDICAID

## 2023-05-05 ENCOUNTER — HOSPITAL ENCOUNTER (OUTPATIENT)
Facility: HOSPITAL | Age: 58
Discharge: HOME | End: 2023-05-05
Attending: UROLOGY | Admitting: UROLOGY
Payer: MEDICAID

## 2023-05-05 VITALS
TEMPERATURE: 98 F | BODY MASS INDEX: 22.04 KG/M2 | DIASTOLIC BLOOD PRESSURE: 87 MMHG | WEIGHT: 149.25 LBS | HEART RATE: 57 BPM | OXYGEN SATURATION: 99 % | SYSTOLIC BLOOD PRESSURE: 145 MMHG | RESPIRATION RATE: 16 BRPM

## 2023-05-05 DIAGNOSIS — Z01.818 PREOPERATIVE TESTING: ICD-10-CM

## 2023-05-05 DIAGNOSIS — N13.5 OBSTRUCTION OF BOTH URETERS: ICD-10-CM

## 2023-05-05 DIAGNOSIS — N13.5 URETERAL STRICTURE: ICD-10-CM

## 2023-05-05 DIAGNOSIS — Z96.0 RETAINED URETERAL STENT: Primary | ICD-10-CM

## 2023-05-05 LAB — POCT GLUCOSE: 142 MG/DL (ref 70–110)

## 2023-05-05 PROCEDURE — 25000003 PHARM REV CODE 250: Performed by: UROLOGY

## 2023-05-05 PROCEDURE — D9220A PRA ANESTHESIA: Mod: ANES,,, | Performed by: ANESTHESIOLOGY

## 2023-05-05 PROCEDURE — 88300 SURGICAL PATH GROSS: CPT | Performed by: PATHOLOGY

## 2023-05-05 PROCEDURE — D9220A PRA ANESTHESIA: ICD-10-PCS | Mod: CRNA,,, | Performed by: NURSE ANESTHETIST, CERTIFIED REGISTERED

## 2023-05-05 PROCEDURE — 52332 CYSTOSCOPY AND TREATMENT: CPT | Mod: 50,,, | Performed by: UROLOGY

## 2023-05-05 PROCEDURE — C1769 GUIDE WIRE: HCPCS | Performed by: UROLOGY

## 2023-05-05 PROCEDURE — 88300 SURGICAL PATH GROSS: CPT | Mod: 26,,, | Performed by: PATHOLOGY

## 2023-05-05 PROCEDURE — 25000003 PHARM REV CODE 250: Performed by: ANESTHESIOLOGY

## 2023-05-05 PROCEDURE — 74420 PR  X-RAY RETROGRADE PYELOGRAM: ICD-10-PCS | Mod: 26,,, | Performed by: UROLOGY

## 2023-05-05 PROCEDURE — 63600175 PHARM REV CODE 636 W HCPCS: Performed by: NURSE ANESTHETIST, CERTIFIED REGISTERED

## 2023-05-05 PROCEDURE — 52332 PR CYSTOSCOPY,INSERT URETERAL STENT: ICD-10-PCS | Mod: 50,,, | Performed by: UROLOGY

## 2023-05-05 PROCEDURE — C2617 STENT, NON-COR, TEM W/O DEL: HCPCS | Performed by: UROLOGY

## 2023-05-05 PROCEDURE — 36000707: Performed by: UROLOGY

## 2023-05-05 PROCEDURE — 37000009 HC ANESTHESIA EA ADD 15 MINS: Performed by: UROLOGY

## 2023-05-05 PROCEDURE — D9220A PRA ANESTHESIA: Mod: CRNA,,, | Performed by: NURSE ANESTHETIST, CERTIFIED REGISTERED

## 2023-05-05 PROCEDURE — 74420 UROGRAPHY RTRGR +-KUB: CPT | Mod: 26,,, | Performed by: UROLOGY

## 2023-05-05 PROCEDURE — 71000015 HC POSTOP RECOV 1ST HR: Performed by: UROLOGY

## 2023-05-05 PROCEDURE — 25000003 PHARM REV CODE 250: Performed by: NURSE ANESTHETIST, CERTIFIED REGISTERED

## 2023-05-05 PROCEDURE — A4217 STERILE WATER/SALINE, 500 ML: HCPCS | Performed by: UROLOGY

## 2023-05-05 PROCEDURE — 82962 GLUCOSE BLOOD TEST: CPT | Performed by: UROLOGY

## 2023-05-05 PROCEDURE — 88300 PR  SURG PATH,GROSS,LEVEL I: ICD-10-PCS | Mod: 26,,, | Performed by: PATHOLOGY

## 2023-05-05 PROCEDURE — 00910 ANES TRANSURETHRAL PX NOS: CPT | Performed by: UROLOGY

## 2023-05-05 PROCEDURE — 37000008 HC ANESTHESIA 1ST 15 MINUTES: Performed by: UROLOGY

## 2023-05-05 PROCEDURE — 36000706: Performed by: UROLOGY

## 2023-05-05 PROCEDURE — C1758 CATHETER, URETERAL: HCPCS | Performed by: UROLOGY

## 2023-05-05 PROCEDURE — 63600175 PHARM REV CODE 636 W HCPCS: Performed by: ANESTHESIOLOGY

## 2023-05-05 PROCEDURE — D9220A PRA ANESTHESIA: ICD-10-PCS | Mod: ANES,,, | Performed by: ANESTHESIOLOGY

## 2023-05-05 PROCEDURE — 71000016 HC POSTOP RECOV ADDL HR: Performed by: UROLOGY

## 2023-05-05 PROCEDURE — 63600175 PHARM REV CODE 636 W HCPCS: Performed by: UROLOGY

## 2023-05-05 PROCEDURE — 25500020 PHARM REV CODE 255: Performed by: UROLOGY

## 2023-05-05 DEVICE — IMPLANTABLE DEVICE: Type: IMPLANTABLE DEVICE | Site: URETER | Status: FUNCTIONAL

## 2023-05-05 DEVICE — STENT URET PERCUFLEX 6FR 24CM: Type: IMPLANTABLE DEVICE | Site: URETER | Status: FUNCTIONAL

## 2023-05-05 DEVICE — STENT URET PERCUFLEX 6FR 22CM: Type: IMPLANTABLE DEVICE | Site: URETER | Status: FUNCTIONAL

## 2023-05-05 RX ORDER — PROPOFOL 10 MG/ML
VIAL (ML) INTRAVENOUS
Status: DISCONTINUED | OUTPATIENT
Start: 2023-05-05 | End: 2023-05-05

## 2023-05-05 RX ORDER — PHENAZOPYRIDINE HYDROCHLORIDE 100 MG/1
200 TABLET, FILM COATED ORAL 3 TIMES DAILY PRN
Status: DISCONTINUED | OUTPATIENT
Start: 2023-05-05 | End: 2023-05-05 | Stop reason: HOSPADM

## 2023-05-05 RX ORDER — CEPHALEXIN 500 MG/1
500 CAPSULE ORAL EVERY 12 HOURS
Qty: 4 CAPSULE | Refills: 0 | Status: SHIPPED | OUTPATIENT
Start: 2023-05-05 | End: 2023-06-12

## 2023-05-05 RX ORDER — SODIUM CHLORIDE 0.9 G/100ML
IRRIGANT IRRIGATION
Status: DISCONTINUED | OUTPATIENT
Start: 2023-05-05 | End: 2023-05-05 | Stop reason: HOSPADM

## 2023-05-05 RX ORDER — SODIUM CHLORIDE, SODIUM LACTATE, POTASSIUM CHLORIDE, CALCIUM CHLORIDE 600; 310; 30; 20 MG/100ML; MG/100ML; MG/100ML; MG/100ML
INJECTION, SOLUTION INTRAVENOUS CONTINUOUS
Status: DISCONTINUED | OUTPATIENT
Start: 2023-05-05 | End: 2023-05-05 | Stop reason: HOSPADM

## 2023-05-05 RX ORDER — HYDROMORPHONE HYDROCHLORIDE 2 MG/ML
0.2 INJECTION, SOLUTION INTRAMUSCULAR; INTRAVENOUS; SUBCUTANEOUS EVERY 5 MIN PRN
Status: CANCELLED | OUTPATIENT
Start: 2023-05-05

## 2023-05-05 RX ORDER — KETAMINE HYDROCHLORIDE 100 MG/ML
INJECTION, SOLUTION INTRAMUSCULAR; INTRAVENOUS
Status: DISCONTINUED | OUTPATIENT
Start: 2023-05-05 | End: 2023-05-05

## 2023-05-05 RX ORDER — ONDANSETRON 2 MG/ML
INJECTION INTRAMUSCULAR; INTRAVENOUS
Status: DISCONTINUED | OUTPATIENT
Start: 2023-05-05 | End: 2023-05-05

## 2023-05-05 RX ORDER — CEFAZOLIN SODIUM 2 G/50ML
2 SOLUTION INTRAVENOUS
Status: COMPLETED | OUTPATIENT
Start: 2023-05-05 | End: 2023-05-05

## 2023-05-05 RX ORDER — WATER 1 ML/ML
IRRIGANT IRRIGATION
Status: DISCONTINUED | OUTPATIENT
Start: 2023-05-05 | End: 2023-05-05 | Stop reason: HOSPADM

## 2023-05-05 RX ORDER — ACETAMINOPHEN 500 MG
1000 TABLET ORAL
Status: COMPLETED | OUTPATIENT
Start: 2023-05-05 | End: 2023-05-05

## 2023-05-05 RX ORDER — HYDROCODONE BITARTRATE AND ACETAMINOPHEN 5; 325 MG/1; MG/1
1 TABLET ORAL EVERY 6 HOURS PRN
Qty: 3 TABLET | Refills: 0 | Status: SHIPPED | OUTPATIENT
Start: 2023-05-05 | End: 2023-06-12

## 2023-05-05 RX ORDER — SODIUM CHLORIDE 0.9 % (FLUSH) 0.9 %
10 SYRINGE (ML) INJECTION
Status: CANCELLED | OUTPATIENT
Start: 2023-05-05

## 2023-05-05 RX ORDER — LIDOCAINE HYDROCHLORIDE 20 MG/ML
INJECTION INTRAVENOUS
Status: DISCONTINUED | OUTPATIENT
Start: 2023-05-05 | End: 2023-05-05

## 2023-05-05 RX ORDER — KETOROLAC TROMETHAMINE 30 MG/ML
INJECTION, SOLUTION INTRAMUSCULAR; INTRAVENOUS
Status: DISCONTINUED | OUTPATIENT
Start: 2023-05-05 | End: 2023-05-05

## 2023-05-05 RX ORDER — HYDROCODONE BITARTRATE AND ACETAMINOPHEN 5; 325 MG/1; MG/1
1 TABLET ORAL EVERY 4 HOURS PRN
Status: DISCONTINUED | OUTPATIENT
Start: 2023-05-05 | End: 2023-05-05 | Stop reason: HOSPADM

## 2023-05-05 RX ORDER — ACETAMINOPHEN 325 MG/1
650 TABLET ORAL EVERY 4 HOURS PRN
Status: DISCONTINUED | OUTPATIENT
Start: 2023-05-05 | End: 2023-05-05 | Stop reason: HOSPADM

## 2023-05-05 RX ORDER — MIDAZOLAM HYDROCHLORIDE 1 MG/ML
INJECTION, SOLUTION INTRAMUSCULAR; INTRAVENOUS
Status: DISCONTINUED | OUTPATIENT
Start: 2023-05-05 | End: 2023-05-05

## 2023-05-05 RX ORDER — PHENAZOPYRIDINE HYDROCHLORIDE 100 MG/1
200 TABLET, FILM COATED ORAL
Qty: 18 TABLET | Refills: 0 | Status: SHIPPED | OUTPATIENT
Start: 2023-05-05 | End: 2023-06-12

## 2023-05-05 RX ORDER — FENTANYL CITRATE 50 UG/ML
INJECTION, SOLUTION INTRAMUSCULAR; INTRAVENOUS
Status: DISCONTINUED | OUTPATIENT
Start: 2023-05-05 | End: 2023-05-05

## 2023-05-05 RX ADMIN — SODIUM CHLORIDE, SODIUM LACTATE, POTASSIUM CHLORIDE, AND CALCIUM CHLORIDE: .6; .31; .03; .02 INJECTION, SOLUTION INTRAVENOUS at 07:05

## 2023-05-05 RX ADMIN — FENTANYL CITRATE 50 MCG: 50 INJECTION, SOLUTION INTRAMUSCULAR; INTRAVENOUS at 07:05

## 2023-05-05 RX ADMIN — LIDOCAINE HYDROCHLORIDE 50 MG: 20 INJECTION, SOLUTION INTRAVENOUS at 07:05

## 2023-05-05 RX ADMIN — MIDAZOLAM HYDROCHLORIDE 2 MG: 1 INJECTION, SOLUTION INTRAMUSCULAR; INTRAVENOUS at 07:05

## 2023-05-05 RX ADMIN — PROPOFOL 50 MG: 10 INJECTION, EMULSION INTRAVENOUS at 07:05

## 2023-05-05 RX ADMIN — HYDROCODONE BITARTRATE AND ACETAMINOPHEN 1 TABLET: 5; 325 TABLET ORAL at 08:05

## 2023-05-05 RX ADMIN — KETAMINE HYDROCHLORIDE 25 MG: 100 INJECTION, SOLUTION, CONCENTRATE INTRAMUSCULAR; INTRAVENOUS at 07:05

## 2023-05-05 RX ADMIN — CEFAZOLIN SODIUM 2 G: 2 SOLUTION INTRAVENOUS at 07:05

## 2023-05-05 RX ADMIN — ONDANSETRON 4 MG: 2 INJECTION, SOLUTION INTRAMUSCULAR; INTRAVENOUS at 07:05

## 2023-05-05 RX ADMIN — KETOROLAC TROMETHAMINE 30 MG: 30 INJECTION, SOLUTION INTRAMUSCULAR; INTRAVENOUS at 07:05

## 2023-05-05 RX ADMIN — ACETAMINOPHEN 1000 MG: 500 TABLET ORAL at 06:05

## 2023-05-05 NOTE — OP NOTE
[facility] Surgery  Surgery Department  Urology Operative Note    SUMMARY     Date of Procedure: 5/5/2023     Surgeon(s) and Role:     * Dena Paula MD - Primary    Assisting Surgeon: None    Pre-Operative Diagnosis: Retained ureteral stent [Z96.0]  Obstruction of both ureters [N13.5]  Ureteral stricture [N13.5]    Post-Operative Diagnosis: Post-Op Diagnosis Codes:     * Retained ureteral stent [Z96.0]     * Obstruction of both ureters [N13.5]     * Ureteral stricture [N13.5]    Procedure: Procedure(s) (LRB):  Cystoscopy  Bilateral retrograde pyelogram  Bilateral ureteral stent exchange - single stent on right, 2 stents on left in complete duplicated collecting system.    Anesthesia: Choice    Indication for Procedure: 58yo F with known retroperitoneal fibrosis/ureteral strictures.  She has been managed with indwelling ureteral stents.  She has been lost to followup multiple times.  She presents now after extended period of retained stents.  Her last stent exchange was in 10/2021.  She presents today for stent exchange.  She understands possible complications due to her retained stents.  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    Description of Procedure: The patient was brought to operating room and placed under general anesthesia. Full time out procedures were performed identifying correct patient, procedure and laterality. Appropriate antibiotics with Ancef were given prior to commencement of surgery. The patient was placed in dorsal lithotomy position and prepped and draped in the usual sterile fashion.     A 22Fr rigid cystoscopy was placed per urethral and passed into the bladder. Urethra retracted and rigid. No urethral strictures were noted. Cystoscopy did not reveal any abnormality of the bladder other than previously placed stents.  film was obtained showing bilateral ureteral stents, two on left.         The stents were examined and were noted to be dark in  nature, but had no stone encrustation.  First the right side was addressed and a sensor wire was passed alongside the stent and up to the level of the kidney.  Once the wire was in place, the stent was grasped and removed in its entirety.  The stent was able to be removed with minimal to no resistance.  Next, a dual lumen exchange catheter was passed over the wire and a retrograde pyelogram was performed with full strength Omnipaque solution.  There was noted to be narrow distal ureter and moderate hydroureteronephrosis proximal to this.  She was noted to have a partially duplicated system, but one ureter down from the proximal ureter down to the bladder.  Next, a cystoscope was replaced back into the bladder over the wire.  A 6-Portuguese x 22 cm double-J ureteral stent with no string was then passed over the wire and up to the level of the kidney.  The wire was removed and good curls noted in the proximal and distal portion of the stent.     Similar procedure was then performed on the patient's left side.  Initially, the upper pole/medial ureteral orifice was addressed. The stent was grasped with flexible graspers and brought out to the urethral meatus.  The wire was able to be passed through the lumen of the stent and up to the level of the upper pole of the left kidney. The stent was then removed.  A retrograde pyelogram was performed with a dual-lumen exchange catheter that showed with narrowed distal ureter and moderate upper pole hydronephrosis. Cystoscope was replaced back into the bladder and a 6 Portuguese by 24 cm double-J ureteral stent was passed over the wire into the upper pole moiety.        A similar procedure was performed for the lower pole moiety where stent was grasped and brought to the urethral meatus.  A wire was unable to be passed through the lumen of the stent therefore wire was placed alongside stent up to level of the lower pole moiety. Stent removed. There was mild resistance in removal of the  stent.  No encrustation on  stent. Retrograde pyelogram was performed showing moderate proximal hydronephrosis of the lower pole moiety. The cystoscope was replaced back into the bladder and a 6-Belarusian x 20 cm JJ stent was then passed over the wire and up to the level of the kidney.  The wire was removed and coil was noted in the proximal portion and distal portion of the stent.      At the conclusion of the procedure, all 3 stents were in the proper position.  There was minimal to no hematuria noted from the ureters. The bladder was then drained and the cystoscope was removed.  The patient was awakened from general anesthesia and transferred to the PACU in stable condition.       Findings: Retracted urethra. No encrustation noted on retained stents.  Stents exchanged without issue.  Two stents placed on left in complete duplication of collecting system    Complications: No    Estimated Blood Loss (EBL): <5cc    Drains: R - 6Fr x 22cm JJ stent, LUP - 6Fr x 24cm JJ stent, LLP - 6Fr x 20cm JJ stent           Implants:   Implant Name Type Inv. Item Serial No.  Lot No. LRB No. Used Action   STENT URET PERCUFLEX 6FR 24CM - [id number]  STENT URET PERCUFLEX 6FR 24CM  [employer] [id number] Left 1 Implanted   STENT URET PERCUFLEX 6FR 22CM - [id number]  STENT URET PERCUFLEX 6FR 22CM  [employer] [id number] Right 1 Implanted   STENT URET PERCUFLEX 6FR 20CM - [id number]  STENT URET PERCUFLEX 6FR 20CM  [employer] [id number] Left 1 Implanted   STENT URET PERCUFLEX 6FR 24CM - [id number]  STENT URET PERCUFLEX 6FR 24CM  [employer] [id number] Left 1 Wasted       Specimens: ureteral stents  Specimen (24h ago, onward)       Start     Ordered    05/05/23 0752  Specimen to Pathology, Surgery Urology  Once        Comments: Pre-op Diagnosis: Retained ureteral stent [Z96.0]Obstruction of both ureters [N13.5]Ureteral stricture [N13.5]Procedure(s):CYSTOURETEROSCOPY, WITH RETROGRADE PYELOGRAM AND URETERAL STENT  INSERTION Number of specimens: 1Name of specimens: Ureteral stents     References:    Click here for ordering Quick Tip   Question Answer Comment   Procedure Type: Urology    Specimen Class: Routine/Screening    Which provider would you like to cc? RASHID MACKENZIE    Release to patient Immediate        05/05/23 0752                            Condition: Good    Disposition: PACU - hemodynamically stable.    Attestation: I was present and scrubbed for the entire procedure.    Discharge Note    SUMMARY     Admit Date: 5/5/2023    Discharge Date and Time:  05/05/2023 7:19 AM    Hospital Course (synopsis of major diagnoses, care, treatment, and services provided during the course of the hospital stay): Uncomplicated stent exchange     Final Diagnosis: Post-Op Diagnosis Codes:     * Retained ureteral stent [Z96.0]     * Obstruction of both ureters [N13.5]     * Ureteral stricture [N13.5]    Disposition: Home or Self Care    Follow Up/Patient Instructions:     Medications:  Reconciled Home Medications:      Medication List        START taking these medications      cephALEXin 500 MG capsule  Commonly known as: KEFLEX  Take 1 capsule (500 mg total) by mouth every 12 (twelve) hours.     HYDROcodone-acetaminophen 5-325 mg per tablet  Commonly known as: NORCO  Take 1 tablet by mouth every 6 (six) hours as needed for Pain.     phenazopyridine 100 MG tablet  Commonly known as: PYRIDIUM  Take 2 tablets (200 mg total) by mouth 3 (three) times daily with meals.            CONTINUE taking these medications      atorvastatin 20 MG tablet  Commonly known as: LIPITOR  Take 20 mg by mouth once daily.     estradioL 0.01 % (0.1 mg/gram) vaginal cream  Commonly known as: ESTRACE  Place 1 g vaginally twice a week.     lisinopriL 10 MG tablet  Take 10 mg by mouth once daily.     metFORMIN 500 MG tablet  Commonly known as: GLUCOPHAGE  Take 500 mg by mouth 2 (two) times daily with meals.     TRUE METRIX GLUCOSE TEST STRIP Strp  Generic  drug: blood sugar diagnostic  USE 1 STRIP TO CHECK GLUCOSE THREE TIMES DAILY     TRUEPLUS LANCETS 33 gauge Misc  Generic drug: lancets  USE 1 TO CHECK GLUCOSE THREE TIMES DAILY     valACYclovir 1000 MG tablet  Commonly known as: VALTREX  Take 1 tablet (1,000 mg total) by mouth 3 (three) times daily. for 7 days            Discharge Procedure Orders   Diet general     No dressing needed     Call MD for:  temperature >100.4     Call MD for:  persistent nausea and vomiting     Call MD for:  severe uncontrolled pain     Call MD for:  difficulty breathing, headache or visual disturbances     Activity as tolerated      Follow-up Information       Dena Paula MD Follow up in 3 month(s).    Specialty: Urology  Why: For post-op follow up  Contact information:  [address]  [address]  [facility] [address] [phone]

## 2023-05-05 NOTE — DISCHARGE INSTRUCTIONS
ACTIVITY LEVEL: If you have received sedation or an anesthetic, you may feel sleepy for several hours. Rest  until you are more awake. Gradually resume your normal activities.    DIET: You may resume your home diet. If nausea is present, increase your diet gradually with fluids and bland  foods.    Medications: Pain medication should be taken only if needed and as directed. If antibiotics are prescribed, the  medication should be taken until completed. You will be given an updated list of you medications.    No driving, alcoholic beverages or signing legal documents for next 24  hours or while taking pain medication    CALL THE DOCTOR:  Fever over 101°F  Severe pain that doesnt go away with medication.  Upset stomach and vomiting that is persistent.  Problems urinating-unable to urinate or heavy bleeding (with or without clots)

## 2023-05-05 NOTE — TRANSFER OF CARE
Anesthesia Transfer of Care Note    Patient: Lizy Jimenez    Procedure(s) Performed: Procedure(s) (LRB):  CYSTOURETEROSCOPY, WITH RETROGRADE PYELOGRAM AND URETERAL STENT INSERTION (Bilateral)    Patient location: OPS    Anesthesia Type: MAC    Transport from OR: Transported from OR on room air with adequate spontaneous ventilation    Post pain: pain needs to be addressed    Post assessment: no apparent anesthetic complications    Post vital signs: stable    Level of consciousness: awake    Nausea/Vomiting: no nausea/vomiting    Complications: none    Transfer of care protocol was followed      Last vitals:   Visit Vitals  /84 (BP Location: Right arm, Patient Position: Lying)   Pulse 69   Temp 36.4 °C (97.5 °F) (Oral)   Resp 16   Wt 67.7 kg (149 lb 4 oz)   SpO2 98%   Breastfeeding No   BMI 22.04 kg/m²

## 2023-05-05 NOTE — ANESTHESIA POSTPROCEDURE EVALUATION
Anesthesia Post Evaluation    Patient: Lizy Jimenez    Procedure(s) Performed: Procedure(s) (LRB):  CYSTOURETEROSCOPY, WITH RETROGRADE PYELOGRAM AND URETERAL STENT INSERTION (Bilateral)    Final Anesthesia Type: MAC      Patient location during evaluation: [facility]  Patient participation: Yes- Able to Participate  Level of consciousness: awake and alert  Post-procedure vital signs: reviewed and stable  Pain management: adequate  Airway patency: patent    PONV status at discharge: No PONV  Anesthetic complications: no      Cardiovascular status: blood pressure returned to baseline  Respiratory status: room air, spontaneous ventilation and unassisted  Hydration status: euvolemic  Follow-up not needed.          Vitals Value Taken Time   /84 05/05/23 0808   Temp 36.4 °C (97.5 °F) 05/05/23 0808   Pulse 69 05/05/23 0808   Resp 16 05/05/23 0808   SpO2 98 % 05/05/23 0808         No case tracking events are documented in the log.      Pain/Nick Score: Pain Rating Prior to Med Admin: 6 (5/5/2023  6:09 AM)

## 2023-05-11 LAB
FINAL PATHOLOGIC DIAGNOSIS: NORMAL
GROSS: NORMAL
Lab: NORMAL

## 2023-06-12 ENCOUNTER — OFFICE VISIT (OUTPATIENT)
Dept: UROLOGY | Facility: CLINIC | Age: 58
End: 2023-06-12
Payer: MEDICAID

## 2023-06-12 VITALS — BODY MASS INDEX: 20.98 KG/M2 | WEIGHT: 142.06 LBS

## 2023-06-12 DIAGNOSIS — N13.5 OBSTRUCTION OF BOTH URETERS: ICD-10-CM

## 2023-06-12 DIAGNOSIS — Z96.0 RETAINED URETERAL STENT: ICD-10-CM

## 2023-06-12 DIAGNOSIS — R39.89 SUSPECTED UTI: Primary | ICD-10-CM

## 2023-06-12 DIAGNOSIS — N30.10 INTERSTITIAL CYSTITIS: ICD-10-CM

## 2023-06-12 PROCEDURE — 1160F RVW MEDS BY RX/DR IN RCRD: CPT | Mod: CPTII,,, | Performed by: UROLOGY

## 2023-06-12 PROCEDURE — 1159F PR MEDICATION LIST DOCUMENTED IN MEDICAL RECORD: ICD-10-PCS | Mod: CPTII,,, | Performed by: UROLOGY

## 2023-06-12 PROCEDURE — 87186 SC STD MICRODIL/AGAR DIL: CPT | Performed by: UROLOGY

## 2023-06-12 PROCEDURE — 4010F ACE/ARB THERAPY RXD/TAKEN: CPT | Mod: CPTII,,, | Performed by: UROLOGY

## 2023-06-12 PROCEDURE — 99999 PR PBB SHADOW E&M-EST. PATIENT-LVL III: CPT | Mod: PBBFAC,,, | Performed by: UROLOGY

## 2023-06-12 PROCEDURE — 4010F PR ACE/ARB THEARPY RXD/TAKEN: ICD-10-PCS | Mod: CPTII,,, | Performed by: UROLOGY

## 2023-06-12 PROCEDURE — 87086 URINE CULTURE/COLONY COUNT: CPT | Performed by: UROLOGY

## 2023-06-12 PROCEDURE — 3008F PR BODY MASS INDEX (BMI) DOCUMENTED: ICD-10-PCS | Mod: CPTII,,, | Performed by: UROLOGY

## 2023-06-12 PROCEDURE — 99999 PR PBB SHADOW E&M-EST. PATIENT-LVL III: ICD-10-PCS | Mod: PBBFAC,,, | Performed by: UROLOGY

## 2023-06-12 PROCEDURE — 87077 CULTURE AEROBIC IDENTIFY: CPT | Performed by: UROLOGY

## 2023-06-12 PROCEDURE — 3008F BODY MASS INDEX DOCD: CPT | Mod: CPTII,,, | Performed by: UROLOGY

## 2023-06-12 PROCEDURE — 1160F PR REVIEW ALL MEDS BY PRESCRIBER/CLIN PHARMACIST DOCUMENTED: ICD-10-PCS | Mod: CPTII,,, | Performed by: UROLOGY

## 2023-06-12 PROCEDURE — 87088 URINE BACTERIA CULTURE: CPT | Performed by: UROLOGY

## 2023-06-12 PROCEDURE — 99213 OFFICE O/P EST LOW 20 MIN: CPT | Mod: PBBFAC | Performed by: UROLOGY

## 2023-06-12 PROCEDURE — 1159F MED LIST DOCD IN RCRD: CPT | Mod: CPTII,,, | Performed by: UROLOGY

## 2023-06-12 PROCEDURE — 99214 PR OFFICE/OUTPT VISIT, EST, LEVL IV, 30-39 MIN: ICD-10-PCS | Mod: S$PBB,,, | Performed by: UROLOGY

## 2023-06-12 PROCEDURE — 99214 OFFICE O/P EST MOD 30 MIN: CPT | Mod: S$PBB,,, | Performed by: UROLOGY

## 2023-06-12 NOTE — PROGRESS NOTES
Subjective:       Lizy Jimenez is a 57 y.o. female who is an established patient of Dr. Zaragoza was seen for evaluation of RPF.      She was last seen 12/15 by RCA. 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 Also with diagnosis of IC (given Elmiron by RCA). She was managed with indwelling stents that were changes q3mths since 2005. Stents were changed by RCA 11/15. After that, she established cared with Dr Smith at .     She has not been seen by this practice in almost 2 years. Last stent exchange by RCA in 11/15 required R URS due to displaced stent (difficult to interpret op note). She reports last stent exchange by Dr Smith was in 7/16 - she is VERY overdue for stent exchange. She did not bring any records from Dr Smith.     In review of her notes, it is very difficult to understand the etiology/workup of her strictures/RPF. She is s/p XRT for cervical cancer. She states that the stents were in place prior to XRT and were done due to kidney stones. She was offered reimplantation but she did not want to do this due to down time from surgery.      Stent exchange (uncomplicated) on 11/17/17. R - 6Fr x 22cm. LUP - 6Fr x 22cm, LLP - 6Fr x 20cm     8/27/2019  She was lost to follow up again and eventually had stents exchanged 7/19/19 without much difficulty. Prior stent exchange was 11/17. Complete L ureteral duplication.     7/27/2020  Again lost to follow up for stent exchange. One year since last exchange (7/19/19). Now with UTI symptoms. Two stents on L due to duplication.    9/13/2021  Yet again, lost to follow up for stent exchange. Last done 8/14/20. Now with dysuria. Still declines definitive reimplantation/ureterolysis surgery or further workup.     1/11/2022  Occasional bladder spasms and pain in SP area.     4/6/2023  Here to arrange stent exchange. Last done 10/8/21. Denies stent related issues.       6/12/2023  Stents exchanged 5/5/23. Occasional dysuria.       R - 6Fr x 22cm JJ stent.   L UP - 6Fr x 24cm JJ stent.   L LP - 6Fr x 20cm JJ stent.       The following portions of the patient's history were reviewed and updated as appropriate: allergies, current medications, past family history, past medical history, past social history, past surgical history and problem list.    Review of Systems  Constitutional: no fever or chills  ENT: no nasal congestion or sore throat  Respiratory: no cough or shortness of breath  Cardiovascular: no chest pain or palpitations  Gastrointestinal: no nausea or vomiting, tolerating diet  Genitourinary: as per HPI  Hematologic/Lymphatic: no easy bruising or lymphadenopathy  Musculoskeletal: no arthralgias or myalgias  Skin: no rashes or lesions  Neurological: no seizures or tremors  Behavioral/Psych: no auditory or visual hallucinations        Objective:    Vitals: Wt 64.4 kg (142 lb 1.4 oz)   BMI 20.98 kg/m²     Physical Exam   General: well developed, well nourished in no acute distress  Head: normocephalic, atraumatic  Neck: supple, trachea midline, no obvious enlargement of thyroid  HEENT: EOMI, mucus membranes moist, sclera anicteric, no hearing impairment  Lungs: symmetric expansion, non-labored breathing  Skin: no rashes or lesions  Neuro: alert and oriented x 3, no gross deficits  Psych: normal judgment and insight, normal mood/affect and non-anxious  Genitourinary:   patient declined exam      Lab Review   Urine analysis today in clinic shows positive for nitrites, leukocytes, 100 protein, 250 glucose, 5-10 RBCs    Lab Results   Component Value Date    WBC 6.55 04/28/2023    HGB 11.0 (L) 04/28/2023    HCT 33.8 (L) 04/28/2023    MCV 95 04/28/2023     04/28/2023     Lab Results   Component Value Date    CREATININE 0.9 04/28/2023    BUN 11 04/28/2023       Imaging  Images and reports were personally reviewed by me and discussed with patient         Assessment/Plan:       1. Retained ureteral stent    - Stent exchange delayed >1 year   - Stents exchanged by myself on 11/17/17 without complication despite prolonged indwelling time. Minimal encrustation. Similar procedure done 7/19 with prolonged indwelling time.    - Discussed referral to Dr Santacruz for definitive repair   - Complete duplication on L   - Stents exchanged 10/8/21, 5/5/23.     2. Retroperitoneal fibrosis    - Unsure how this was diagnosed     3. Ureteral stricture    - Due to stones or XRT - unsure at this time     4. Interstitial cystitis    - Unsure diagnosis   - Was on Elmiron       5. Bladder pain    - UCx clear previously, resend today due to symptoms   - Uribel PRN    6. Dyspareunia   - Suspect related to XRT   - Estrace 2x weekly   - Discussed PFPT      Follow up in 8 months

## 2023-06-14 ENCOUNTER — TELEPHONE (OUTPATIENT)
Dept: UROLOGY | Facility: CLINIC | Age: 58
End: 2023-06-14
Payer: MEDICAID

## 2023-06-14 LAB — BACTERIA UR CULT: ABNORMAL

## 2023-06-14 RX ORDER — SULFAMETHOXAZOLE AND TRIMETHOPRIM 800; 160 MG/1; MG/1
1 TABLET ORAL 2 TIMES DAILY
Qty: 14 TABLET | Refills: 0 | Status: SHIPPED | OUTPATIENT
Start: 2023-06-14 | End: 2023-06-21

## 2023-06-14 NOTE — TELEPHONE ENCOUNTER
LM for pt to contact office re: culture results.      ----- Message from Dena Paula MD sent at 6/14/2023  1:13 PM CDT -----  Please inform patient of urinary tract infection on recent urine culture. Appropriate antibiotics (Bactrim) were sent in to the pharmacy.

## 2023-11-08 NOTE — H&P
Subjective:       Lizy Jimenez is a 54 y.o. female who is an established patient of Dr. Zaragoza was seen for evaluation of RPF.      She was last seen 12/15 by [facility]CA. 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 Also with diagnosis of IC (given Elmiron by [facility]). She was managed with indwelling stents that were changes q3mths since 2005. Stents were changed by [facility] 11/15. After that, she established cared with Dr Smith at .     She has not been seen by this practice in almost 2 years. Last stent exchange by [facility] in 11/15 required R URS due to displaced stent (difficult to interpret op note). She reports last stent exchange by Dr Smith was in 7/16 - she is VERY overdue for stent exchange. She did not bring any records from Dr Smith.     In review of her notes, it is very difficult to understand the etiology/workup of her strictures/RPF. She is s/p XRT for cervical cancer. She states that the stents were in place prior to XRT and were done due to kidney stones. She was offered reimplantation but she did not want to do this due to down time from surgery.      Stent exchange (uncomplicated) on 11/17/17. R - 6Fr x 22cm. LUP - 6Fr x 22cm, LLP - 6Fr x 20cm     8/27/2019  She was lost to follow up again and eventually had stents exchanged 7/19/19 without much difficulty. Prior stent exchange was 11/17. Complete L ureteral duplication.     7/27/2020  Again lost to follow up for stent exchange. One year since last exchange (7/19/19). Now with UTI symptoms. Two stents on L due to duplication.    R - 6Fr x 22cm JJ stent.   L UP - 6Fr x 24cm JJ stent.   L LP - 6Fr x 20cm JJ stent.       The following portions of the patient's history were reviewed and updated as appropriate: allergies, current medications, past family history, past medical history, past social history, past surgical history and problem list.    Review of  Carlito Rosario is a 27M with history of IVDU currently at [facility] who was sent here by [facility] for endocarditis concerns (in 9/2023 he was diagnosed with endocarditis and had MRSA bacteremia but did not complete treatment)     Tachycardic  bpm, afebrile without leukocytosis   TTE shows medium mobile echogenic mass present on anterior leaflet of tricuspid valve  Bcx pending  Diagnosed with endocarditis and hospitalized in MICU in 09/2023 for septic shock,   Per ID notes in 09/2023: Daptomycin 580mg IV q24h and Ceftaroline 600mg IV q8H recommended for salvage; will continue for now  ID consulted, appreciate recommendations  Opioid withdrawal PRNs  Dispo to return to [facility] at discharge   "Systems  Constitutional: no fever or chills  ENT: no nasal congestion or sore throat  Respiratory: no cough or shortness of breath  Cardiovascular: no chest pain or palpitations  Gastrointestinal: no nausea or vomiting, tolerating diet  Genitourinary: as per HPI  Hematologic/Lymphatic: no easy bruising or lymphadenopathy  Musculoskeletal: no arthralgias or myalgias  Skin: no rashes or lesions  Neurological: no seizures or tremors  Behavioral/Psych: no auditory or visual hallucinations        Objective:    Vitals: Ht 5' 8" (1.727 m)   Wt 70.1 kg (154 lb 10.4 oz)   BMI 23.51 kg/m²     Physical Exam   General: well developed, well nourished in no acute distress  Head: normocephalic, atraumatic  Neck: supple, trachea midline, no obvious enlargement of thyroid  HEENT: EOMI, mucus membranes moist, sclera anicteric, no hearing impairment  Lungs: symmetric expansion, non-labored breathing  Cardiovascular: regular rate and rhythm, normal pulses  Abdomen: soft, non tender, non distended, no palpable masses, no hepatosplenomegaly, no hernias, no CVA tenderness  Musculoskeletal: no peripheral edema, normal ROM in bilateral upper and lower extremities  Lymphatics: no cervical or inguinal lymphadenopathy  Skin: no rashes or lesions  Neuro: alert and oriented x 3, no gross deficits  Psych: normal judgment and insight, normal mood/affect and non-anxious  Genitourinary:   patient declined exam      Lab Review   Urine analysis today in clinic shows positive for nitrites, leukocytes, red blood cells 50    Lab Results   Component Value Date    WBC 7.50 07/12/2019    HGB 13.1 07/12/2019    HCT 38.9 07/12/2019    MCV 93 07/12/2019     (H) 07/12/2019     Lab Results   Component Value Date    CREATININE 1.3 07/12/2019    BUN 19 07/12/2019       Imaging  Images and reports were personally reviewed by me and discussed with patient         Assessment/Plan:      1. Retained ureteral stent    - Stent exchange delayed >1 year   - Stents " exchanged by myself on 11/17/17 without complication despite prolonged indwelling time. Minimal encrustation. Similar procedure done 7/19 with prolonged indwelling time.    - Discussed referral to Dr Santacruz for definitive repair   - Complete duplication on L   - Again overdue for stent exchange - will plan for exchange 8/14/20     2. Retroperitoneal fibrosis    - Unsure how this was diagnosed     3. Ureteral stricture    - Due to stones or XRT - unsure at this time     4. Interstitial cystitis    - Unsure diagnosis   - Was on Elmiron     5. Dysuria   - UCx now   - Bactrim empiric   - Needs stents exchanged        Follow up in 3 months

## 2024-07-19 ENCOUNTER — TELEPHONE (OUTPATIENT)
Dept: UROLOGY | Facility: CLINIC | Age: 59
End: 2024-07-19
Payer: MEDICAID

## 2024-07-19 NOTE — TELEPHONE ENCOUNTER
Pt was contacted [facility].  ----- Message from Florencio Kiser sent at 7/19/2024  2:34 PM CDT -----  Regarding: self  Type: Patient Call Back    Who called:self    What is the request in detail:calling to ask if this provider have morning appts because of school    Can the clinic reply by [facility]?no    Would the patient rather a call back or a response via [facility]? no    Best call back number:[phone]    Additional Information:

## 2024-07-22 ENCOUNTER — TELEPHONE (OUTPATIENT)
Dept: PREADMISSION TESTING | Facility: HOSPITAL | Age: 59
End: 2024-07-22
Payer: MEDICAID

## 2024-07-22 ENCOUNTER — OFFICE VISIT (OUTPATIENT)
Dept: UROLOGY | Facility: CLINIC | Age: 59
End: 2024-07-22
Payer: MEDICAID

## 2024-07-22 VITALS — BODY MASS INDEX: 21.21 KG/M2 | WEIGHT: 143.63 LBS

## 2024-07-22 DIAGNOSIS — R39.89 BLADDER PAIN: ICD-10-CM

## 2024-07-22 DIAGNOSIS — N30.10 INTERSTITIAL CYSTITIS: ICD-10-CM

## 2024-07-22 DIAGNOSIS — N13.5 URETERAL STRICTURE: ICD-10-CM

## 2024-07-22 DIAGNOSIS — N13.5 OBSTRUCTION OF BOTH URETERS: ICD-10-CM

## 2024-07-22 DIAGNOSIS — Z96.0 RETAINED URETERAL STENT: Primary | ICD-10-CM

## 2024-07-22 PROCEDURE — 3008F BODY MASS INDEX DOCD: CPT | Mod: CPTII,,, | Performed by: UROLOGY

## 2024-07-22 PROCEDURE — 1159F MED LIST DOCD IN RCRD: CPT | Mod: CPTII,,, | Performed by: UROLOGY

## 2024-07-22 PROCEDURE — 1160F RVW MEDS BY RX/DR IN RCRD: CPT | Mod: CPTII,,, | Performed by: UROLOGY

## 2024-07-22 PROCEDURE — 99214 OFFICE O/P EST MOD 30 MIN: CPT | Mod: S$PBB,,, | Performed by: UROLOGY

## 2024-07-22 PROCEDURE — 87086 URINE CULTURE/COLONY COUNT: CPT | Performed by: UROLOGY

## 2024-07-22 PROCEDURE — 4010F ACE/ARB THERAPY RXD/TAKEN: CPT | Mod: CPTII,,, | Performed by: UROLOGY

## 2024-07-22 PROCEDURE — 99213 OFFICE O/P EST LOW 20 MIN: CPT | Mod: PBBFAC | Performed by: UROLOGY

## 2024-07-22 PROCEDURE — 99999 PR PBB SHADOW E&M-EST. PATIENT-LVL III: CPT | Mod: PBBFAC,,, | Performed by: UROLOGY

## 2024-07-22 RX ORDER — CEFAZOLIN SODIUM 2 G/50ML
2 SOLUTION INTRAVENOUS
OUTPATIENT
Start: 2024-07-22

## 2024-07-22 NOTE — PROGRESS NOTES
Subjective:       Lizy Jimenez is a 58 y.o. female who is an established patient of Dr. Zaragoza was seen for evaluation of RPF.      She was last seen 12/15 by [facility]. 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 Also with diagnosis of IC (given Elmiron by RCA). She was managed with indwelling stents that were changes q3mths since 2005. Stents were changed by [facility] 11/15. After that, she established cared with Dr Smith at .     She has not been seen by this practice in almost 2 years. Last stent exchange by [facility]CA in 11/15 required R URS due to displaced stent (difficult to interpret op note). She reports last stent exchange by Dr Smith was in 7/16 - she is VERY overdue for stent exchange. She did not bring any records from Dr Smith.     In review of her notes, it is very difficult to understand the etiology/workup of her strictures/RPF. She is s/p XRT for cervical cancer. She states that the stents were in place prior to XRT and were done due to kidney stones. She was offered reimplantation but she did not want to do this due to down time from surgery.      Stent exchange (uncomplicated) on 11/17/17. R - 6Fr x 22cm. LUP - 6Fr x 22cm, LLP - 6Fr x 20cm     8/27/2019  She was lost to follow up again and eventually had stents exchanged 7/19/19 without much difficulty. Prior stent exchange was 11/17. Complete L ureteral duplication.     7/27/2020  Again lost to follow up for stent exchange. One year since last exchange (7/19/19). Now with UTI symptoms. Two stents on L due to duplication.    9/13/2021  Yet again, lost to follow up for stent exchange. Last done 8/14/20. Now with dysuria. Still declines definitive reimplantation/ureterolysis surgery or further workup.     1/11/2022  Occasional bladder spasms and pain in SP area.     4/6/2023  Here to arrange stent exchange. Last done 10/8/21. Denies stent related issues.       6/12/2023  Stents exchanged 5/5/23. Occasional dysuria.     7/22/2024  Here to arrange stent exchange. No UTIs. No LUTS. Using Uribel PRN.     R - 6Fr x 22cm JJ stent.   L UP - 6Fr x 24cm JJ stent.   L LP - 6Fr x 20cm JJ stent.       The following portions of the patient's history were reviewed and updated as appropriate: allergies, current medications, past family history, past medical history, past social history, past surgical history and problem list.    Review of Systems  Constitutional: no fever or chills  ENT: no nasal congestion or sore throat  Respiratory: no cough or shortness of breath  Cardiovascular: no chest pain or palpitations  Gastrointestinal: no nausea or vomiting, tolerating diet  Genitourinary: as per HPI  Hematologic/Lymphatic: no easy bruising or lymphadenopathy  Musculoskeletal: no arthralgias or myalgias  Skin: no rashes or lesions  Neurological: no seizures or tremors  Behavioral/Psych: no auditory or visual hallucinations        Objective:    Vitals: Wt 65.2 kg (143 lb 10.1 oz)   BMI 21.21 kg/m²     Physical Exam   General: well developed, well nourished in no acute distress  Head: normocephalic, atraumatic  Neck: supple, trachea midline, no obvious enlargement of thyroid  HEENT: EOMI, mucus membranes moist, sclera anicteric, no hearing impairment  Lungs: symmetric expansion, non-labored breathing  Skin: no rashes or lesions  Neuro: alert and oriented x 3, no gross deficits  Psych: normal judgment and insight, normal mood/affect and non-anxious  Genitourinary:  deferred      Lab Review   Urine analysis today in clinic shows - pending     Lab Results   Component Value Date    WBC 6.55 04/28/2023    HGB 11.0 (L) 04/28/2023    HCT 33.8 (L) 04/28/2023    MCV 95 04/28/2023     04/28/2023     Lab Results   Component Value Date    CREATININE 0.9 04/28/2023    BUN 11 04/28/2023       Imaging  Images and reports were personally reviewed by me and discussed with patient          Assessment/Plan:      1. Retained ureteral stent    - Stent exchange delayed >1 year   - Stents exchanged by myself on 11/17/17 without complication despite prolonged indwelling time. Minimal encrustation. Similar procedure done 7/2019 with prolonged indwelling time.    - Discussed referral to Dr Santacruz for definitive repair   - Complete duplication on L   - Stents exchanged 10/8/21, 5/5/23.   - Repeat stent exchange 8/9/24     2. Retroperitoneal fibrosis    - Unsure how this was diagnosed     3. Ureteral stricture    - Due to stones or XRT - unsure at this time     4. Interstitial cystitis    - Unsure diagnosis   - Was on Elmiron     5. Bladder pain    - UCx clear previously, resend today due to symptoms   - Uribel PRN    6. Dyspareunia   - Suspect related to XRT   - Estrace 2x weekly   - Discussed PFPT      Follow up in 10 months

## 2024-07-24 LAB — BACTERIA UR CULT: NORMAL

## 2024-08-02 ENCOUNTER — ANESTHESIA EVENT (OUTPATIENT)
Dept: SURGERY | Facility: HOSPITAL | Age: 59
End: 2024-08-02
Payer: MEDICAID

## 2024-08-02 ENCOUNTER — HOSPITAL ENCOUNTER (OUTPATIENT)
Dept: PREADMISSION TESTING | Facility: HOSPITAL | Age: 59
Discharge: HOME | End: 2024-08-02
Attending: UROLOGY
Payer: MEDICAID

## 2024-08-02 VITALS
OXYGEN SATURATION: 95 % | DIASTOLIC BLOOD PRESSURE: 66 MMHG | RESPIRATION RATE: 18 BRPM | TEMPERATURE: 97 F | SYSTOLIC BLOOD PRESSURE: 106 MMHG | HEIGHT: 69 IN | WEIGHT: 137.25 LBS | HEART RATE: 76 BPM | BODY MASS INDEX: 20.33 KG/M2

## 2024-08-02 DIAGNOSIS — Z01.818 PREOPERATIVE TESTING: Primary | ICD-10-CM

## 2024-08-02 LAB
ALBUMIN SERPL BCP-MCNC: 3.5 G/DL (ref 3.5–5.2)
ALP SERPL-CCNC: 76 U/L (ref 55–135)
ALT SERPL W/O P-5'-P-CCNC: 11 U/L (ref 10–44)
ANION GAP SERPL CALC-SCNC: 8 MMOL/L (ref 8–16)
AST SERPL-CCNC: 13 U/L (ref 10–40)
BASOPHILS # BLD AUTO: 0.04 K/UL (ref 0–0.2)
BASOPHILS NFR BLD: 0.6 % (ref 0–1.9)
BILIRUB SERPL-MCNC: 0.6 MG/DL (ref 0.1–1)
BUN SERPL-MCNC: 17 MG/DL (ref 6–20)
CALCIUM SERPL-MCNC: 9.8 MG/DL (ref 8.7–10.5)
CHLORIDE SERPL-SCNC: 102 MMOL/L (ref 95–110)
CO2 SERPL-SCNC: 25 MMOL/L (ref 23–29)
CREAT SERPL-MCNC: 1 MG/DL (ref 0.5–1.4)
DIFFERENTIAL METHOD BLD: ABNORMAL
EOSINOPHIL # BLD AUTO: 0.1 K/UL (ref 0–0.5)
EOSINOPHIL NFR BLD: 1.1 % (ref 0–8)
ERYTHROCYTE [DISTWIDTH] IN BLOOD BY AUTOMATED COUNT: 11.3 % (ref 11.5–14.5)
EST. GFR  (NO RACE VARIABLE): >60 ML/MIN/1.73 M^2
GLUCOSE SERPL-MCNC: 221 MG/DL (ref 70–110)
HCT VFR BLD AUTO: 37.1 % (ref 37–48.5)
HGB BLD-MCNC: 12 G/DL (ref 12–16)
IMM GRANULOCYTES # BLD AUTO: 0.01 K/UL (ref 0–0.04)
IMM GRANULOCYTES NFR BLD AUTO: 0.2 % (ref 0–0.5)
LYMPHOCYTES # BLD AUTO: 1.9 K/UL (ref 1–4.8)
LYMPHOCYTES NFR BLD: 30 % (ref 18–48)
MCH RBC QN AUTO: 31.4 PG (ref 27–31)
MCHC RBC AUTO-ENTMCNC: 32.3 G/DL (ref 32–36)
MCV RBC AUTO: 97 FL (ref 82–98)
MONOCYTES # BLD AUTO: 0.6 K/UL (ref 0.3–1)
MONOCYTES NFR BLD: 8.7 % (ref 4–15)
NEUTROPHILS # BLD AUTO: 3.7 K/UL (ref 1.8–7.7)
NEUTROPHILS NFR BLD: 59.4 % (ref 38–73)
NRBC BLD-RTO: 0 /100 WBC
OHS QRS DURATION: 74 MS
OHS QTC CALCULATION: 414 MS
PLATELET # BLD AUTO: 301 K/UL (ref 150–450)
PMV BLD AUTO: 9 FL (ref 9.2–12.9)
POTASSIUM SERPL-SCNC: 4.4 MMOL/L (ref 3.5–5.1)
PROT SERPL-MCNC: 7.2 G/DL (ref 6–8.4)
RBC # BLD AUTO: 3.82 M/UL (ref 4–5.4)
SODIUM SERPL-SCNC: 135 MMOL/L (ref 136–145)
WBC # BLD AUTO: 6.3 K/UL (ref 3.9–12.7)

## 2024-08-02 PROCEDURE — 93005 ELECTROCARDIOGRAM TRACING: CPT

## 2024-08-02 PROCEDURE — 93010 ELECTROCARDIOGRAM REPORT: CPT | Mod: ,,, | Performed by: INTERNAL MEDICINE

## 2024-08-02 PROCEDURE — 85025 COMPLETE CBC W/AUTO DIFF WBC: CPT | Performed by: UROLOGY

## 2024-08-02 PROCEDURE — 80053 COMPREHEN METABOLIC PANEL: CPT | Performed by: UROLOGY

## 2024-08-02 NOTE — DISCHARGE INSTRUCTIONS
YOUR PROCEDURE WILL BE AT [facility] at [address] [place] [address]                  Before 7 AM, enter through the Emergency Entrance..   After 7 AM enter through the Main Entrance.                 Report to the Same Day Surgery Registration Desk in the hallway.(Just beside the Same Day Surgery Unit)      Your procedure  is scheduled for __8/9/2024________.    Call [phone] between 2pm and 5pm on __8/8/2024_____to find out your arrival time for the day of surgery.    You may have two visitors.  No children under 12 years old.     You will be going to the Same Day Surgery Unit on the 2nd floor of the hospital.    Important instructions:  Do not eat anything after midnight.  You may have plain water, non carbonated.  You may also have Gatorade or Powerade after midnight.    Stop all fluids 2 hours before your surgery.    It is okay to brush your teeth.  Do not have gum, candy or mints.    SEE MEDICATION SHEET.   TAKE MEDICATIONS AS DIRECTED WITH SIPS OF WATER.      Do not take any diabetic medication on the morning of surgery unless instructed to do so by your doctor or pre op nurse.      All GLP-1 weekly diabetic/weight loss medications must not be taken for one week before your surgery, or your surgery could be canceled.      STOP taking Aspirin, Ibuprofen,  Advil, Motrin, Mobic(meloxicam), Aleve (naproxen), Fish oil, and Vitamin E for at least 7 days before your surgery.     You may take Tylenol if needed which is not a blood thinner.    Please shower the night before and the morning of your surgery.      Contact lenses and removable denture work may not be worn during your procedure.    You may wear deodorant only. If you are having breast surgery, do not wear deodorant on the operative side.    Do not wear powder, body lotion, perfume/cologne or make-up.    Do not wear any jewelry or have any metal on your body.    You will be asked to remove any dentures or partials  for the procedure.    If you are going home on the same day of surgery, you must arrange for a family member or a friend to drive you home.  Public transportation is prohibited.  You will not be able to drive home if you were given anesthesia or sedation.    Patients who want to have their Post-op prescriptions filled from our in-house [facility], bring a Credit/Debit Card or cash with you. A co-pay may be required.  The pharmacy closes at 5:30 pm.    Wear loose fitting clothes allowing for bandages.    Please leave money and valuables home.      You may bring your cell phone.    Call the doctor if fever or illness should occur before your surgery.    Call [phone] to contact us here if needed.                            CLOTHES ON DAY OF SURGERY    SHOULDER surgery:  you must have a very oversized shirt.  Very, Very large.  You will probably have a large sling on with your arm strapped to your chest.  You will not be able to put the arm of the operated shoulder into a sleeve.  You can put the arm of the un-operated shoulder into the sleeve, but the shirt will need to be draped over the operated shoulder.       ARM or HAND surgery:  make sure that your sleeves are large and loose enough to pass over large dressings or cast.      BREAST or UNDERARM surgery:  wear a loose, button down shirt so that you can dress without raising your arms over your head.    ABDOMINAL surgery:  wear loose, comfortable clothing.  Nothing tight around the abdomen.  NO JEANS    PENIS or SCROTAL surgery:  loose comfortable clothing.  Large sweat pants, pajama pants or a robe.  ABSOLUTELY NO JEANS      LEG or FOOT surgery:  wear large loose pants that are able to pass over any large dressings or casts.  You could also wear loose shorts or a skirt.

## 2024-08-09 ENCOUNTER — HOSPITAL ENCOUNTER (OUTPATIENT)
Facility: HOSPITAL | Age: 59
Discharge: HOME | End: 2024-08-09
Attending: UROLOGY | Admitting: UROLOGY
Payer: MEDICAID

## 2024-08-09 ENCOUNTER — ANESTHESIA (OUTPATIENT)
Dept: SURGERY | Facility: HOSPITAL | Age: 59
End: 2024-08-09
Payer: MEDICAID

## 2024-08-09 DIAGNOSIS — N13.5 OBSTRUCTION OF BOTH URETERS: Primary | ICD-10-CM

## 2024-08-09 DIAGNOSIS — N13.5 URETERAL STRICTURE: ICD-10-CM

## 2024-08-09 DIAGNOSIS — Z96.0 RETAINED URETERAL STENT: ICD-10-CM

## 2024-08-09 LAB — POCT GLUCOSE: 148 MG/DL (ref 70–110)

## 2024-08-09 PROCEDURE — 37000009 HC ANESTHESIA EA ADD 15 MINS: Performed by: UROLOGY

## 2024-08-09 PROCEDURE — 36000707: Performed by: UROLOGY

## 2024-08-09 PROCEDURE — 63600175 PHARM REV CODE 636 W HCPCS: Performed by: ANESTHESIOLOGY

## 2024-08-09 PROCEDURE — 25000003 PHARM REV CODE 250: Performed by: NURSE ANESTHETIST, CERTIFIED REGISTERED

## 2024-08-09 PROCEDURE — C1769 GUIDE WIRE: HCPCS | Performed by: UROLOGY

## 2024-08-09 PROCEDURE — C1758 CATHETER, URETERAL: HCPCS | Performed by: UROLOGY

## 2024-08-09 PROCEDURE — 52332 CYSTOSCOPY AND TREATMENT: CPT | Mod: 50,,, | Performed by: UROLOGY

## 2024-08-09 PROCEDURE — 37000008 HC ANESTHESIA 1ST 15 MINUTES: Performed by: UROLOGY

## 2024-08-09 PROCEDURE — C2617 STENT, NON-COR, TEM W/O DEL: HCPCS | Performed by: UROLOGY

## 2024-08-09 PROCEDURE — 74420 UROGRAPHY RTRGR +-KUB: CPT | Mod: 26,,, | Performed by: UROLOGY

## 2024-08-09 PROCEDURE — 88300 SURGICAL PATH GROSS: CPT | Mod: 26,,, | Performed by: PATHOLOGY

## 2024-08-09 PROCEDURE — 71000033 HC RECOVERY, INTIAL HOUR: Performed by: UROLOGY

## 2024-08-09 PROCEDURE — 25000003 PHARM REV CODE 250: Performed by: UROLOGY

## 2024-08-09 PROCEDURE — 25500020 PHARM REV CODE 255: Performed by: UROLOGY

## 2024-08-09 PROCEDURE — 63600175 PHARM REV CODE 636 W HCPCS: Performed by: UROLOGY

## 2024-08-09 PROCEDURE — 63600175 PHARM REV CODE 636 W HCPCS: Performed by: NURSE ANESTHETIST, CERTIFIED REGISTERED

## 2024-08-09 PROCEDURE — 82962 GLUCOSE BLOOD TEST: CPT | Performed by: UROLOGY

## 2024-08-09 PROCEDURE — 71000015 HC POSTOP RECOV 1ST HR: Performed by: UROLOGY

## 2024-08-09 PROCEDURE — 25000003 PHARM REV CODE 250: Performed by: ANESTHESIOLOGY

## 2024-08-09 PROCEDURE — 88300 SURGICAL PATH GROSS: CPT | Performed by: PATHOLOGY

## 2024-08-09 PROCEDURE — 36000706: Performed by: UROLOGY

## 2024-08-09 DEVICE — URETERAL STENT
Type: IMPLANTABLE DEVICE | Site: URETER | Status: FUNCTIONAL
Brand: PERCUFLEX™

## 2024-08-09 RX ORDER — HYDROCODONE BITARTRATE AND ACETAMINOPHEN 5; 325 MG/1; MG/1
1 TABLET ORAL EVERY 6 HOURS PRN
Qty: 6 TABLET | Refills: 0 | Status: SHIPPED | OUTPATIENT
Start: 2024-08-09

## 2024-08-09 RX ORDER — HYDROMORPHONE HYDROCHLORIDE 2 MG/ML
0.2 INJECTION, SOLUTION INTRAMUSCULAR; INTRAVENOUS; SUBCUTANEOUS EVERY 5 MIN PRN
Status: DISCONTINUED | OUTPATIENT
Start: 2024-08-09 | End: 2024-08-09 | Stop reason: HOSPADM

## 2024-08-09 RX ORDER — CEPHALEXIN 500 MG/1
500 CAPSULE ORAL EVERY 12 HOURS
Qty: 4 CAPSULE | Refills: 0 | Status: SHIPPED | OUTPATIENT
Start: 2024-08-09

## 2024-08-09 RX ORDER — ACETAMINOPHEN 325 MG/1
650 TABLET ORAL EVERY 4 HOURS PRN
Status: CANCELLED | OUTPATIENT
Start: 2024-08-09

## 2024-08-09 RX ORDER — GLUCAGON 1 MG
1 KIT INJECTION
Status: DISCONTINUED | OUTPATIENT
Start: 2024-08-09 | End: 2024-08-09 | Stop reason: HOSPADM

## 2024-08-09 RX ORDER — SODIUM CHLORIDE, SODIUM LACTATE, POTASSIUM CHLORIDE, CALCIUM CHLORIDE 600; 310; 30; 20 MG/100ML; MG/100ML; MG/100ML; MG/100ML
INJECTION, SOLUTION INTRAVENOUS CONTINUOUS
Status: DISCONTINUED | OUTPATIENT
Start: 2024-08-09 | End: 2024-08-09 | Stop reason: HOSPADM

## 2024-08-09 RX ORDER — FENTANYL CITRATE 50 UG/ML
INJECTION, SOLUTION INTRAMUSCULAR; INTRAVENOUS
Status: DISCONTINUED | OUTPATIENT
Start: 2024-08-09 | End: 2024-08-09

## 2024-08-09 RX ORDER — ONDANSETRON HYDROCHLORIDE 2 MG/ML
4 INJECTION, SOLUTION INTRAVENOUS DAILY PRN
Status: DISCONTINUED | OUTPATIENT
Start: 2024-08-09 | End: 2024-08-09 | Stop reason: HOSPADM

## 2024-08-09 RX ORDER — PROPOFOL 10 MG/ML
VIAL (ML) INTRAVENOUS
Status: DISCONTINUED | OUTPATIENT
Start: 2024-08-09 | End: 2024-08-09

## 2024-08-09 RX ORDER — PHENAZOPYRIDINE HYDROCHLORIDE 100 MG/1
200 TABLET, FILM COATED ORAL 3 TIMES DAILY PRN
Status: DISCONTINUED | OUTPATIENT
Start: 2024-08-09 | End: 2024-08-09 | Stop reason: HOSPADM

## 2024-08-09 RX ORDER — MIDAZOLAM HYDROCHLORIDE 1 MG/ML
INJECTION INTRAMUSCULAR; INTRAVENOUS
Status: DISCONTINUED | OUTPATIENT
Start: 2024-08-09 | End: 2024-08-09

## 2024-08-09 RX ORDER — LIDOCAINE HYDROCHLORIDE 10 MG/ML
1 INJECTION, SOLUTION EPIDURAL; INFILTRATION; INTRACAUDAL; PERINEURAL ONCE
Status: DISCONTINUED | OUTPATIENT
Start: 2024-08-09 | End: 2024-08-09 | Stop reason: HOSPADM

## 2024-08-09 RX ORDER — PHENAZOPYRIDINE HYDROCHLORIDE 100 MG/1
200 TABLET, FILM COATED ORAL 3 TIMES DAILY PRN
Qty: 18 TABLET | Refills: 0 | Status: SHIPPED | OUTPATIENT
Start: 2024-08-09

## 2024-08-09 RX ORDER — SODIUM CHLORIDE 0.9 % (FLUSH) 0.9 %
10 SYRINGE (ML) INJECTION
Status: DISCONTINUED | OUTPATIENT
Start: 2024-08-09 | End: 2024-08-09 | Stop reason: HOSPADM

## 2024-08-09 RX ORDER — ACETAMINOPHEN 500 MG
1000 TABLET ORAL
Status: COMPLETED | OUTPATIENT
Start: 2024-08-09 | End: 2024-08-09

## 2024-08-09 RX ORDER — HYDROCODONE BITARTRATE AND ACETAMINOPHEN 5; 325 MG/1; MG/1
1 TABLET ORAL EVERY 4 HOURS PRN
Status: CANCELLED | OUTPATIENT
Start: 2024-08-09

## 2024-08-09 RX ORDER — LIDOCAINE HYDROCHLORIDE 20 MG/ML
INJECTION INTRAVENOUS
Status: DISCONTINUED | OUTPATIENT
Start: 2024-08-09 | End: 2024-08-09

## 2024-08-09 RX ADMIN — ACETAMINOPHEN 1000 MG: 500 TABLET ORAL at 09:08

## 2024-08-09 RX ADMIN — PHENAZOPYRIDINE HYDROCHLORIDE 200 MG: 100 TABLET ORAL at 01:08

## 2024-08-09 RX ADMIN — SODIUM CHLORIDE, POTASSIUM CHLORIDE, SODIUM LACTATE AND CALCIUM CHLORIDE: 600; 310; 30; 20 INJECTION, SOLUTION INTRAVENOUS at 11:08

## 2024-08-09 RX ADMIN — PROPOFOL 50 MG: 10 INJECTION, EMULSION INTRAVENOUS at 11:08

## 2024-08-09 RX ADMIN — SODIUM CHLORIDE, POTASSIUM CHLORIDE, SODIUM LACTATE AND CALCIUM CHLORIDE: 600; 310; 30; 20 INJECTION, SOLUTION INTRAVENOUS at 09:08

## 2024-08-09 RX ADMIN — FENTANYL CITRATE 50 MCG: 50 INJECTION, SOLUTION INTRAMUSCULAR; INTRAVENOUS at 11:08

## 2024-08-09 RX ADMIN — MIDAZOLAM HYDROCHLORIDE 2 MG: 1 INJECTION INTRAMUSCULAR; INTRAVENOUS at 11:08

## 2024-08-09 RX ADMIN — LIDOCAINE HYDROCHLORIDE 20 MG: 20 INJECTION, SOLUTION INTRAVENOUS at 11:08

## 2024-08-09 RX ADMIN — FENTANYL CITRATE 25 MCG: 50 INJECTION, SOLUTION INTRAMUSCULAR; INTRAVENOUS at 11:08

## 2024-08-09 RX ADMIN — CEFAZOLIN 2 G: 2 INJECTION, POWDER, FOR SOLUTION INTRAMUSCULAR; INTRAVENOUS at 11:08

## 2024-08-09 NOTE — OP NOTE
[facility]  Surgery Department  Urology Operative Note    SUMMARY     Date of Procedure: 8/9/2024     Surgeon(s) and Role:     * Dena Paula MD - Primary    Assisting Surgeon: None    Pre-Operative Diagnosis: Retained ureteral stent [Z96.0]  Obstruction of both ureters [N13.5]  Ureteral stricture [N13.5]    Post-Operative Diagnosis: Post-Op Diagnosis Codes:     * Retained ureteral stent [Z96.0]     * Obstruction of both ureters [N13.5]     * Ureteral stricture [N13.5]    Procedure: Procedure(s) (LRB):  Cystoscopy  Bilateral retrograde pyelogram  Bilateral ureteral stent exchange - single stent on right, two stents on left in complete duplicated collecting system.    Anesthesia: Choice    Indication for Procedure: 57yo F with known retroperitoneal fibrosis/ureteral strictures.  She has been managed with indwelling ureteral stents.  She presents now after extended period of retained stents.  Her last stent exchange was in 5/2023.  She presents today for stent exchange.  She understands possible complications due to her retained stents.  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    Description of Procedure: The patient was brought to operating room and placed under general anesthesia. Full time out procedures were performed identifying correct patient, procedure and laterality. Appropriate antibiotics with Ancef were given prior to commencement of surgery. The patient was placed in dorsal lithotomy position and prepped and draped in the usual sterile fashion.     A 22Fr rigid cystoscopy was placed per urethral and passed into the bladder. Urethra retracted and rigid. No urethral strictures were noted. Cystoscopy did not reveal any abnormality of the bladder other than previously placed stents.  film was obtained showing bilateral ureteral stents, two on left.         The stents were examined and were noted to be dark in nature, but had no stone encrustation.  First the  right side was addressed.  The stent was grasped and brought to the urethral meatus.  A wire was passed through the lumen of the stent and up to the level of the kidney as confirmed on fluoroscopy.  The stent was removed in its entirety without resistance. Next, a dual lumen exchange catheter was passed over the wire and a retrograde pyelogram was performed with full strength Omnipaque solution.  There was noted to be normal caliber distal ureter and moderate hydroureteronephrosis proximal to this.  She was noted to have a partially duplicated system, but one ureter down from the proximal ureter down to the bladder.  Next, a cystoscope was replaced back into the bladder over the wire.  A 6-Vietnamese x 22 cm double-J ureteral stent with no string was then passed over the wire and up to the level of the kidney.  The wire was removed and good curls noted in the proximal and distal portion of the stent.     Similar procedure was then performed on the patient's left side.  Initially, the upper pole/medial ureteral orifice was addressed. The stent was grasped with flexible graspers and brought out to the urethral meatus.  The wire was able to be passed through the lumen of the stent and up to the level of the upper pole of the left kidney. The stent was then removed.  A retrograde pyelogram was performed with a dual-lumen exchange catheter that showed with narrowed distal ureter and moderate upper pole hydronephrosis. Cystoscope was replaced back into the bladder and a 6 Vietnamese by 24 cm double-J ureteral stent was passed over the wire into the upper pole moiety.        A similar procedure was performed for the lower pole moiety where stent was grasped and brought to the urethral meatus.  A wire passed through the lumen of the stent and up to the lower pole moiety of the left kidney. Stent removed without resistance.  No encrustation on stent..Retrograde pyelogram was performed showing moderate proximal hydronephrosis of the  lower pole moiety. The cystoscope was replaced back into the bladder and a 6-Dominican x 20 cm JJ stent was then passed over the wire and up to the level of the kidney.  The wire was removed and coil was noted in the proximal portion and distal portion of the stent.      At the conclusion of the procedure, all 3 stents were in the proper position.  There was minimal to no hematuria noted from the ureters. The bladder was then drained and the cystoscope was removed.  The patient was awakened from general anesthesia and transferred to the PACU in stable condition.       Findings: Retracted urethra. No encrustation noted on retained stents.  Stents exchanged without issue.  Two stents placed on left in complete duplication of collecting system    Complications: No    Estimated Blood Loss (EBL): <5cc    Drains: R - 6Fr x 22cm JJ stent, LUP - 6Fr x 24cm JJ stent, LLP - 6Fr x 20cm JJ stent           Implants:   Implant Name Type Inv. Item Serial No.  Lot No. LRB No. Used Action   STENT URET PERCUFLEX 6FR 22CM - [id number]  STENT URET PERCUFLEX 6FR 22CM  [employer] [id number]  1 Implanted   STENT URET PERCUFLEX 6FR 24CM - [id number]  STENT URET PERCUFLEX 6FR 24CM  [employer] [id number]  1 Implanted   STENT URET PERCUFLEX 6FR 20CM - [id number]  STENT URET PERCUFLEX 6FR 20CM  [employer] [id number]  1 Implanted         Specimens:  Bilateral ureteral stents  Specimen (24h ago, onward)       Start     Ordered    08/09/24 1205  Specimen to Pathology, Surgery Urology  Once        Comments: Pre-op Diagnosis: Retained ureteral stent [Z96.0]Obstruction of both ureters [N13.5]Ureteral stricture [N13.5]Procedure(s):CYSTOSCOPY, WITH RETROGRADE PYELOGRAM AND URETERAL STENT INSERTION Number of specimens: 1Name of specimens: URETERAL STENTS     References:    Click here for ordering Quick Tip   Question Answer Comment   Procedure Type: Urology    Specimen Class: Routine/Screening    Release to patient Immediate         08/09/24 1205                            Condition: Good    Disposition: PACU - hemodynamically stable.    Attestation: I was present and scrubbed for the entire procedure.    Discharge Note    SUMMARY     Admit Date: 8/9/2024    Discharge Date and Time:  08/09/2024 7:19 AM    Hospital Course (synopsis of major diagnoses, care, treatment, and services provided during the course of the hospital stay): Uncomplicated stent exchange     Final Diagnosis: Post-Op Diagnosis Codes:     * Retained ureteral stent [Z96.0]     * Obstruction of both ureters [N13.5]     * Ureteral stricture [N13.5]    Disposition: Home or Self Care    Follow Up/Patient Instructions:     Medications:  Reconciled Home Medications:      Medication List        START taking these medications      cephALEXin 500 MG capsule  Commonly known as: KEFLEX  Take 1 capsule (500 mg total) by mouth every 12 (twelve) hours.     HYDROcodone-acetaminophen 5-325 mg per tablet  Commonly known as: NORCO  Take 1 tablet by mouth every 6 (six) hours as needed for Pain.     phenazopyridine 100 MG tablet  Commonly known as: PYRIDIUM  Take 2 tablets (200 mg total) by mouth 3 (three) times daily as needed for Pain.            CONTINUE taking these medications      atorvastatin 20 MG tablet  Commonly known as: LIPITOR  Take 20 mg by mouth once daily.     estradioL 0.01 % (0.1 mg/gram) vaginal cream  Commonly known as: ESTRACE  Place 1 g vaginally twice a week.     lisinopriL 10 MG tablet  Take 10 mg by mouth once daily.     metFORMIN 500 MG tablet  Commonly known as: GLUCOPHAGE  Take 500 mg by mouth 2 (two) times daily with meals.     methen-m.blue-s.phos-phsal-hyo 118-10-40.8-36 mg Cap  Commonly known as: URIBEL  Take 1 capsule by mouth 3 (three) times daily as needed (bladder pain).     TRUE METRIX GLUCOSE TEST STRIP Strp  Generic drug: blood sugar diagnostic  USE 1 STRIP TO CHECK GLUCOSE THREE TIMES DAILY     TRUEPLUS LANCETS 33 gauge Misc  Generic drug: lancets  USE 1  TO CHECK GLUCOSE THREE TIMES DAILY            Discharge Procedure Orders   Diet general     No dressing needed     Call MD for:  temperature >100.4     Call MD for:  persistent nausea and vomiting     Call MD for:  severe uncontrolled pain     Call MD for:  difficulty breathing, headache or visual disturbances     Activity as tolerated        Follow-up Information       Dena Paula MD Follow up in 6 week(s).    Specialty: Urology  Why: For post-op follow up  Contact information:  [address] [facility] [address]  [address] [place] [id number] [phone]

## 2024-08-09 NOTE — DISCHARGE INSTRUCTIONS
Expect blood and/or burning with urination. Drink plenty of fluids.      ACTIVITY LEVEL: If you have received sedation or an anesthetic, you may feel sleepy for several hours. Rest  until you are more awake. Gradually resume your normal activities.    DIET: You may resume your home diet. If nausea is present, increase your diet gradually with fluids and bland  foods.    Medications: Pain medication should be taken only if needed and as directed. If antibiotics are prescribed, the  medication should be taken until completed. You will be given an updated list of you medications.    No driving, alcoholic beverages or signing legal documents for next 24  hours or while taking pain medication    CALL THE DOCTOR:  Fever over 101°F  Severe pain that doesnt go away with medication.  Upset stomach and vomiting that is persistent.  Problems urinating-unable to urinate or heavy bleeding (with or without clots)ACTIVITY LEVEL: If you have received sedation or an anesthetic, you may feel sleepy for several hours. Rest  until you are more awake. Gradually resume your normal activities.    DIET: You may resume your home diet. If nausea is present, increase your diet gradually with fluids and bland  foods.    Medications: Pain medication should be taken only if needed and as directed. If antibiotics are prescribed, the  medication should be taken until completed. You will be given an updated list of you medications.    No driving, alcoholic beverages or signing legal documents for next 24  hours or while taking pain medication    CALL THE DOCTOR:  Fever over 101°F  Severe pain that doesnt go away with medication.  Upset stomach and vomiting that is persistent.  Problems urinating-unable to urinate or heavy bleeding (with or without clots)ACTIVITY LEVEL: If you have received sedation or an anesthetic, you may feel sleepy for several hours. Rest  until you are more awake. Gradually resume your normal activities.    DIET: You  may resume your home diet. If nausea is present, increase your diet gradually with fluids and bland  foods.    Medications: Pain medication should be taken only if needed and as directed. If antibiotics are prescribed, the  medication should be taken until completed. You will be given an updated list of you medications.    No driving, alcoholic beverages or signing legal documents for next 24  hours or while taking pain medication    CALL THE DOCTOR:  Fever over 101°F  Severe pain that doesnt go away with medication.  Upset stomach and vomiting that is persistent.  Problems urinating-unable to urinate or heavy bleeding (with or without clots)ACTIVITY LEVEL: If you have received sedation or an anesthetic, you may feel sleepy for several hours. Rest  until you are more awake. Gradually resume your normal activities.    DIET: You may resume your home diet. If nausea is present, increase your diet gradually with fluids and bland  foods.    Medications: Pain medication should be taken only if needed and as directed. If antibiotics are prescribed, the  medication should be taken until completed. You will be given an updated list of you medications.    No driving, alcoholic beverages or signing legal documents for next 24  hours or while taking pain medication    CALL THE DOCTOR:  Fever over 101°F  Severe pain that doesnt go away with medication.  Upset stomach and vomiting that is persistent.  Problems urinating-unable to urinate or heavy bleeding (with or without clots)ACTIVITY LEVEL: If you have received sedation or an anesthetic, you may feel sleepy for several hours. Rest  until you are more awake. Gradually resume your normal activities.    DIET: You may resume your home diet. If nausea is present, increase your diet gradually with fluids and bland  foods.    Medications: Pain medication should be taken only if needed and as directed. If antibiotics are prescribed, the  medication should be taken until completed.  You will be given an updated list of you medications.    No driving, alcoholic beverages or signing legal documents for next 24  hours or while taking pain medication    CALL THE DOCTOR:  Fever over 101°F  Severe pain that doesnt go away with medication.  Fall Prevention  Millions of people fall every year and injure themselves. You may have had anesthesia or sedation which may increase your risk of falling. You may have health issues that put you at an increased risk of falling.     Here are ways to reduce your risk of falling.    Make your home safe by keeping walkways clear of objects you may trip over.  Use non-slip pads under rugs. Do not use area rugs or small throw rugs.  Use non-slip mats in bathtubs and showers.  Install handrails and lights on staircases.  Do not walk in poorly lit areas.  Do not stand on chairs or wobbly ladders.  Use caution when reaching overhead or looking upward. This position can cause a loss of balance.  Be sure your shoes fit properly, have non-slip bottoms and are in good condition.   Wear shoes both inside and out. Avoid going barefoot or wearing slippers.  Be cautious when going up and down stairs, curbs, and when walking on uneven sidewalks.  If your balance is poor, consider using a cane or walker.  If your fall was related to alcohol use, stop or limit alcohol intake.   If your fall was related to use of sleeping medicines, talk to your doctor about this. You may need to reduce your dosage at bedtime if you awaken during the night to go to the bathroom.    To reduce the need for nighttime bathroom trips:  Avoid drinking fluids for several hours before going to bed  Empty your bladder before going to bed  Men can keep a urinal at the bedside  Stay as active as you can. Balance, flexibility, strength, and endurance all come from exercise. They all play a role in preventing falls. Ask your healthcare provider which types of activity are right for you.  Get your vision checked  on a regular basis.  If you have pets, know where they are before you stand up or walk so you don't trip over them.  Use night lights.        Upset stomach and vomiting that is persistent.  Problems urinating-unable to urinate or heavy bleeding (with or without clots)

## 2024-08-12 VITALS
HEART RATE: 59 BPM | BODY MASS INDEX: 20.28 KG/M2 | TEMPERATURE: 97 F | RESPIRATION RATE: 16 BRPM | OXYGEN SATURATION: 96 % | DIASTOLIC BLOOD PRESSURE: 70 MMHG | SYSTOLIC BLOOD PRESSURE: 117 MMHG | WEIGHT: 137.31 LBS

## 2024-08-13 LAB
FINAL PATHOLOGIC DIAGNOSIS: NORMAL
GROSS: NORMAL
Lab: NORMAL

## 2025-03-24 ENCOUNTER — TELEPHONE (OUTPATIENT)
Dept: UROLOGY | Facility: CLINIC | Age: 60
End: 2025-03-24
Payer: MEDICAID

## 2025-03-24 NOTE — TELEPHONE ENCOUNTER
----- Message from Hammad Clarke sent at 3/21/2025  3:14 PM CDT -----  Type:  Sooner Appointment RequestPatient is requesting a sooner appointment.  Patient declined first available appointment listed as well as another facility and provider .  Patient will not accept being placed on the waitlist and is requesting a message be sent to doctor.Name of Caller:Pt When is the first available appointment?6/30Symptoms:6 month f/u , blood in urine Would the patient rather a call back or a response via [facility]?Best Call Back Number:Telephone Information:Mobile          [phone] Additional Information: Prefers no Mondays and likes mornings

## 2025-04-07 ENCOUNTER — OFFICE VISIT (OUTPATIENT)
Dept: UROLOGY | Facility: CLINIC | Age: 60
End: 2025-04-07
Payer: MEDICAID

## 2025-04-07 VITALS — WEIGHT: 149.38 LBS | BODY MASS INDEX: 22.13 KG/M2 | HEIGHT: 69 IN

## 2025-04-07 DIAGNOSIS — Z96.0 RETAINED URETERAL STENT: Primary | ICD-10-CM

## 2025-04-07 DIAGNOSIS — N13.5 URETERAL STRICTURE: ICD-10-CM

## 2025-04-07 DIAGNOSIS — N30.10 INTERSTITIAL CYSTITIS: ICD-10-CM

## 2025-04-07 DIAGNOSIS — N13.5 OBSTRUCTION OF BOTH URETERS: ICD-10-CM

## 2025-04-07 PROCEDURE — 99214 OFFICE O/P EST MOD 30 MIN: CPT | Mod: PBBFAC | Performed by: UROLOGY

## 2025-04-07 PROCEDURE — 4010F ACE/ARB THERAPY RXD/TAKEN: CPT | Mod: CPTII,,, | Performed by: UROLOGY

## 2025-04-07 PROCEDURE — 1159F MED LIST DOCD IN RCRD: CPT | Mod: CPTII,,, | Performed by: UROLOGY

## 2025-04-07 PROCEDURE — 87088 URINE BACTERIA CULTURE: CPT | Performed by: UROLOGY

## 2025-04-07 PROCEDURE — 3008F BODY MASS INDEX DOCD: CPT | Mod: CPTII,,, | Performed by: UROLOGY

## 2025-04-07 PROCEDURE — 99214 OFFICE O/P EST MOD 30 MIN: CPT | Mod: S$PBB,,, | Performed by: UROLOGY

## 2025-04-07 PROCEDURE — 1160F RVW MEDS BY RX/DR IN RCRD: CPT | Mod: CPTII,,, | Performed by: UROLOGY

## 2025-04-07 PROCEDURE — 99999 PR PBB SHADOW E&M-EST. PATIENT-LVL IV: CPT | Mod: PBBFAC,,, | Performed by: UROLOGY

## 2025-04-07 RX ORDER — CEFAZOLIN SODIUM 2 G/50ML
2 SOLUTION INTRAVENOUS
OUTPATIENT
Start: 2025-04-07

## 2025-04-07 NOTE — H&P
Subjective:       Lizy Jimenez is a 59 y.o. female who is an established patient of Dr. Zaragoza was seen for evaluation of RPF.      She was last seen 12/15 by [facility]. 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 Also with diagnosis of IC (given Elmiron by [facility]). She was managed with indwelling stents that were changes q3mths since 2005. Stents were changed by [facility] 11/15. After that, she established cared with Dr Smiht at .     She has not been seen by this practice in almost 2 years. Last stent exchange by [facility] in 11/15 required R URS due to displaced stent (difficult to interpret op note). She reports last stent exchange by Dr Smith was in 7/16 - she is VERY overdue for stent exchange. She did not bring any records from Dr Smith.     In review of her notes, it is very difficult to understand the etiology/workup of her strictures/RPF. She is s/p XRT for cervical cancer. She states that the stents were in place prior to XRT and were done due to kidney stones. She was offered reimplantation but she did not want to do this due to down time from surgery.      Stent exchange (uncomplicated) on 11/17/17. R - 6Fr x 22cm. LUP - 6Fr x 22cm, LLP - 6Fr x 20cm     8/27/2019  She was lost to follow up again and eventually had stents exchanged 7/19/19 without much difficulty. Prior stent exchange was 11/17. Complete L ureteral duplication.     7/27/2020  Again lost to follow up for stent exchange. One year since last exchange (7/19/19). Now with UTI symptoms. Two stents on L due to duplication.    9/13/2021  Yet again, lost to follow up for stent exchange. Last done 8/14/20. Now with dysuria. Still declines definitive reimplantation/ureterolysis surgery or further workup.     1/11/2022  Occasional bladder spasms and pain in SP area.     4/6/2023  Here to arrange stent exchange. Last done 10/8/21. Denies stent related issues.   "    6/12/2023  Stents exchanged 5/5/23. Occasional dysuria.     7/22/2024  Here to arrange stent exchange. No UTIs. No LUTS. Using Uribel PRN.     4/7/2025  Last stent exchange 8/9/24. Here with microhematuria that was found during physical. States she was given abx for this. Denies UTI symptoms now and before abx.     R - 6Fr x 22cm JJ stent.   L UP - 6Fr x 24cm JJ stent.   L LP - 6Fr x 20cm JJ stent.       The following portions of the patient's history were reviewed and updated as appropriate: allergies, current medications, past family history, past medical history, past social history, past surgical history and problem list.    Review of Systems  Constitutional: no fever or chills  ENT: no nasal congestion or sore throat  Respiratory: no cough or shortness of breath  Cardiovascular: no chest pain or palpitations  Gastrointestinal: no nausea or vomiting, tolerating diet  Genitourinary: as per HPI  Hematologic/Lymphatic: no easy bruising or lymphadenopathy  Musculoskeletal: no arthralgias or myalgias  Skin: no rashes or lesions  Neurological: no seizures or tremors  Behavioral/Psych: no auditory or visual hallucinations        Objective:    Vitals: Ht 5' 9" (1.753 m)   Wt 67.8 kg (149 lb 5.8 oz)   BMI 22.06 kg/m²     Physical Exam   General: well developed, well nourished in no acute distress  Head: normocephalic, atraumatic  Neck: supple, trachea midline, no obvious enlargement of thyroid  HEENT: EOMI, mucus membranes moist, sclera anicteric, no hearing impairment  Lungs: symmetric expansion, non-labored breathing  Neuro: alert and oriented x 3, no gross deficits  Psych: normal judgment and insight, normal mood/affect and non-anxious  Genitourinary:  deferred      Lab Review   Urine analysis today in clinic shows - ++LE, 30 protein, 250 RBCs    Lab Results   Component Value Date    WBC 6.30 08/02/2024    HGB 12.0 08/02/2024    HCT 37.1 08/02/2024    MCV 97 08/02/2024     08/02/2024     Lab Results "   Component Value Date    CREATININE 1.0 08/02/2024    BUN 17 08/02/2024       Imaging  Images and reports were personally reviewed by me and discussed with patient         Assessment/Plan:      1. Retained ureteral stent    - Stent exchange delayed >1 year   - Stents exchanged by myself on 11/17/17 without complication despite prolonged indwelling time. Minimal encrustation. Similar procedure done 7/2019 with prolonged indwelling time.    - Discussed referral to Dr Santacruz for definitive repair   - Complete duplication on L   - Stents exchanged 10/8/21, 5/5/23, 8/9/24   - OR 5/16/25 for stent exchange.    Microhematuria expected with the presence of indwelling JJ stents.      2. Retroperitoneal fibrosis    - Unsure how this was diagnosed     3. Ureteral stricture    - Due to stones or XRT - unsure at this time     4. Interstitial cystitis    - Unsure diagnosis   - Was on Elmiron     5. Bladder pain    - UCx clear previously, resend today due to symptoms   - Uribel PRN    6. Dyspareunia   - Suspect related to XRT   - Estrace 2x weekly   - Discussed PFPT      Follow up in 10 months

## 2025-04-07 NOTE — PROGRESS NOTES
Subjective:       Lizy Jimenez is a 59 y.o. female who is an established patient of Dr. Zaragoza was seen for evaluation of RPF.      She was last seen 12/15 by [facility]. 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 Also with diagnosis of IC (given Elmiron by [facility]). She was managed with indwelling stents that were changes q3mths since 2005. Stents were changed by [facility] 11/15. After that, she established cared with Dr Smith at .     She has not been seen by this practice in almost 2 years. Last stent exchange by [facility] in 11/15 required R URS due to displaced stent (difficult to interpret op note). She reports last stent exchange by Dr Smith was in 7/16 - she is VERY overdue for stent exchange. She did not bring any records from Dr Smith.     In review of her notes, it is very difficult to understand the etiology/workup of her strictures/RPF. She is s/p XRT for cervical cancer. She states that the stents were in place prior to XRT and were done due to kidney stones. She was offered reimplantation but she did not want to do this due to down time from surgery.      Stent exchange (uncomplicated) on 11/17/17. R - 6Fr x 22cm. LUP - 6Fr x 22cm, LLP - 6Fr x 20cm     8/27/2019  She was lost to follow up again and eventually had stents exchanged 7/19/19 without much difficulty. Prior stent exchange was 11/17. Complete L ureteral duplication.     7/27/2020  Again lost to follow up for stent exchange. One year since last exchange (7/19/19). Now with UTI symptoms. Two stents on L due to duplication.    9/13/2021  Yet again, lost to follow up for stent exchange. Last done 8/14/20. Now with dysuria. Still declines definitive reimplantation/ureterolysis surgery or further workup.     1/11/2022  Occasional bladder spasms and pain in SP area.     4/6/2023  Here to arrange stent exchange. Last done 10/8/21. Denies stent related issues.   "    6/12/2023  Stents exchanged 5/5/23. Occasional dysuria.     7/22/2024  Here to arrange stent exchange. No UTIs. No LUTS. Using Uribel PRN.     4/7/2025  Last stent exchange 8/9/24. Here with microhematuria that was found during physical. States she was given abx for this. Denies UTI symptoms now and before abx.     R - 6Fr x 22cm JJ stent.   L UP - 6Fr x 24cm JJ stent.   L LP - 6Fr x 20cm JJ stent.       The following portions of the patient's history were reviewed and updated as appropriate: allergies, current medications, past family history, past medical history, past social history, past surgical history and problem list.    Review of Systems  Constitutional: no fever or chills  ENT: no nasal congestion or sore throat  Respiratory: no cough or shortness of breath  Cardiovascular: no chest pain or palpitations  Gastrointestinal: no nausea or vomiting, tolerating diet  Genitourinary: as per HPI  Hematologic/Lymphatic: no easy bruising or lymphadenopathy  Musculoskeletal: no arthralgias or myalgias  Skin: no rashes or lesions  Neurological: no seizures or tremors  Behavioral/Psych: no auditory or visual hallucinations        Objective:    Vitals: Ht 5' 9" (1.753 m)   Wt 67.8 kg (149 lb 5.8 oz)   BMI 22.06 kg/m²     Physical Exam   General: well developed, well nourished in no acute distress  Head: normocephalic, atraumatic  Neck: supple, trachea midline, no obvious enlargement of thyroid  HEENT: EOMI, mucus membranes moist, sclera anicteric, no hearing impairment  Lungs: symmetric expansion, non-labored breathing  Neuro: alert and oriented x 3, no gross deficits  Psych: normal judgment and insight, normal mood/affect and non-anxious  Genitourinary:  deferred      Lab Review   Urine analysis today in clinic shows - ++LE, 30 protein, 250 RBCs    Lab Results   Component Value Date    WBC 6.30 08/02/2024    HGB 12.0 08/02/2024    HCT 37.1 08/02/2024    MCV 97 08/02/2024     08/02/2024     Lab Results "   Component Value Date    CREATININE 1.0 08/02/2024    BUN 17 08/02/2024       Imaging  Images and reports were personally reviewed by me and discussed with patient         Assessment/Plan:      1. Retained ureteral stent    - Stent exchange delayed >1 year   - Stents exchanged by myself on 11/17/17 without complication despite prolonged indwelling time. Minimal encrustation. Similar procedure done 7/2019 with prolonged indwelling time.    - Discussed referral to Dr Santacruz for definitive repair   - Complete duplication on L   - Stents exchanged 10/8/21, 5/5/23, 8/9/24   - OR 5/16/25 for stent exchange.    Microhematuria expected with the presence of indwelling JJ stents.      2. Retroperitoneal fibrosis    - Unsure how this was diagnosed     3. Ureteral stricture    - Due to stones or XRT - unsure at this time     4. Interstitial cystitis    - Unsure diagnosis   - Was on Elmiron     5. Bladder pain    - UCx clear previously, resend today due to symptoms   - Uribel PRN    6. Dyspareunia   - Suspect related to XRT   - Estrace 2x weekly   - Discussed PFPT      Follow up in 10 months

## 2025-04-09 ENCOUNTER — TELEPHONE (OUTPATIENT)
Dept: UROLOGY | Facility: CLINIC | Age: 60
End: 2025-04-09
Payer: MEDICAID

## 2025-04-09 ENCOUNTER — RESULTS FOLLOW-UP (OUTPATIENT)
Dept: UROLOGY | Facility: CLINIC | Age: 60
End: 2025-04-09

## 2025-04-09 LAB — BACTERIA UR CULT: ABNORMAL

## 2025-04-09 RX ORDER — CEPHALEXIN 500 MG/1
500 CAPSULE ORAL EVERY 8 HOURS
Qty: 21 CAPSULE | Refills: 0 | Status: SHIPPED | OUTPATIENT
Start: 2025-04-09 | End: 2025-04-16

## 2025-04-17 ENCOUNTER — ANESTHESIA EVENT (OUTPATIENT)
Dept: SURGERY | Facility: HOSPITAL | Age: 60
End: 2025-04-17
Payer: MEDICAID

## 2025-05-09 ENCOUNTER — HOSPITAL ENCOUNTER (OUTPATIENT)
Dept: PREADMISSION TESTING | Facility: HOSPITAL | Age: 60
Discharge: HOME | End: 2025-05-09
Attending: UROLOGY
Payer: MEDICAID

## 2025-05-09 VITALS
TEMPERATURE: 98 F | RESPIRATION RATE: 18 BRPM | BODY MASS INDEX: 20.31 KG/M2 | OXYGEN SATURATION: 98 % | HEIGHT: 69 IN | SYSTOLIC BLOOD PRESSURE: 97 MMHG | DIASTOLIC BLOOD PRESSURE: 63 MMHG | HEART RATE: 71 BPM | WEIGHT: 137.13 LBS

## 2025-05-09 DIAGNOSIS — Z01.818 PREOPERATIVE TESTING: Primary | ICD-10-CM

## 2025-05-09 LAB
ABSOLUTE EOSINOPHIL (OHS): 0.07 K/UL
ABSOLUTE MONOCYTE (OHS): 0.55 K/UL (ref 0.3–1)
ABSOLUTE NEUTROPHIL COUNT (OHS): 3.54 K/UL (ref 1.8–7.7)
ALBUMIN SERPL BCP-MCNC: 3.7 G/DL (ref 3.5–5.2)
ALP SERPL-CCNC: 65 UNIT/L (ref 40–150)
ALT SERPL W/O P-5'-P-CCNC: 11 UNIT/L (ref 10–44)
ANION GAP (OHS): 8 MMOL/L (ref 8–16)
AST SERPL-CCNC: 14 UNIT/L (ref 11–45)
BASOPHILS # BLD AUTO: 0.03 K/UL
BASOPHILS NFR BLD AUTO: 0.5 %
BILIRUB SERPL-MCNC: 0.6 MG/DL (ref 0.1–1)
BUN SERPL-MCNC: 19 MG/DL (ref 6–20)
CALCIUM SERPL-MCNC: 9.2 MG/DL (ref 8.7–10.5)
CHLORIDE SERPL-SCNC: 103 MMOL/L (ref 95–110)
CO2 SERPL-SCNC: 26 MMOL/L (ref 23–29)
CREAT SERPL-MCNC: 0.9 MG/DL (ref 0.5–1.4)
ERYTHROCYTE [DISTWIDTH] IN BLOOD BY AUTOMATED COUNT: 11.9 % (ref 11.5–14.5)
GFR SERPLBLD CREATININE-BSD FMLA CKD-EPI: >60 ML/MIN/1.73/M2
GLUCOSE SERPL-MCNC: 180 MG/DL (ref 70–110)
HCT VFR BLD AUTO: 33.7 % (ref 37–48.5)
HGB BLD-MCNC: 11.2 GM/DL (ref 12–16)
IMM GRANULOCYTES # BLD AUTO: 0.01 K/UL (ref 0–0.04)
IMM GRANULOCYTES NFR BLD AUTO: 0.2 % (ref 0–0.5)
LYMPHOCYTES # BLD AUTO: 2.26 K/UL (ref 1–4.8)
MCH RBC QN AUTO: 32.4 PG (ref 27–31)
MCHC RBC AUTO-ENTMCNC: 33.2 G/DL (ref 32–36)
MCV RBC AUTO: 97 FL (ref 82–98)
NUCLEATED RBC (/100WBC) (OHS): 0 /100 WBC
OHS QRS DURATION: 72 MS
OHS QTC CALCULATION: 400 MS
PLATELET # BLD AUTO: 300 K/UL (ref 150–450)
PMV BLD AUTO: 8.7 FL (ref 9.2–12.9)
POTASSIUM SERPL-SCNC: 4.2 MMOL/L (ref 3.5–5.1)
PROT SERPL-MCNC: 7.1 GM/DL (ref 6–8.4)
RBC # BLD AUTO: 3.46 M/UL (ref 4–5.4)
RELATIVE EOSINOPHIL (OHS): 1.1 %
RELATIVE LYMPHOCYTE (OHS): 35 % (ref 18–48)
RELATIVE MONOCYTE (OHS): 8.5 % (ref 4–15)
RELATIVE NEUTROPHIL (OHS): 54.7 % (ref 38–73)
SODIUM SERPL-SCNC: 137 MMOL/L (ref 136–145)
WBC # BLD AUTO: 6.46 K/UL (ref 3.9–12.7)

## 2025-05-09 PROCEDURE — 85025 COMPLETE CBC W/AUTO DIFF WBC: CPT | Performed by: UROLOGY

## 2025-05-09 PROCEDURE — 93010 ELECTROCARDIOGRAM REPORT: CPT | Mod: ,,, | Performed by: INTERNAL MEDICINE

## 2025-05-09 PROCEDURE — 82040 ASSAY OF SERUM ALBUMIN: CPT | Performed by: UROLOGY

## 2025-05-09 PROCEDURE — 93005 ELECTROCARDIOGRAM TRACING: CPT

## 2025-05-09 RX ORDER — GABAPENTIN 300 MG/1
300 CAPSULE ORAL DAILY PRN
COMMUNITY
Start: 2022-06-08

## 2025-05-09 RX ORDER — LATANOPROST 50 UG/ML
1 SOLUTION/ DROPS OPHTHALMIC NIGHTLY
COMMUNITY

## 2025-05-09 RX ORDER — CLOTRIMAZOLE 1 %
CREAM (GRAM) TOPICAL 2 TIMES DAILY
COMMUNITY
Start: 2024-09-18

## 2025-05-09 RX ORDER — FLUTICASONE PROPIONATE 50 MCG
1 SPRAY, SUSPENSION (ML) NASAL DAILY PRN
COMMUNITY
Start: 2022-12-30

## 2025-05-09 NOTE — ANESTHESIA PREPROCEDURE EVALUATION
05/09/2025  Lizy Jimenez is a 59 y.o., female  To undergo Procedure(s) (LRB):  CYSTOSCOPY, WITH RETROGRADE PYELOGRAM AND URETERAL STENT INSERTION (Bilateral)     Denies CP/SOB/GERD/MI/CVA/URI symptoms.  METS > 4  NPO > 8    Past Medical History:  Past Medical History:   Diagnosis Date    Cancer     cervical--s/p radiation tx and chemo    Diabetes mellitus     Hydronephrosis     Hypertension     Interstitial cystitis     Nocturia     Vaginal delivery     x1       Past Surgical History:  Past Surgical History:   Procedure Laterality Date    CYSTOSCOPY WITH URETEROSCOPY, RETROGRADE PYELOGRAPHY, AND INSERTION OF STENT Bilateral 10/8/2021    Procedure: CYSTOSCOPY, WITH RETROGRADE PYELOGRAM AND URETERAL STENT INSERTION;  Surgeon: Dena Paula MD;  Location: [facility] OR;  Service: Urology;  Laterality: Bilateral;  RN Pre Op, Covid NEGATIVE ON  10-5-21.  [place]    CYSTOSCOPY WITH URETEROSCOPY, RETROGRADE PYELOGRAPHY, AND INSERTION OF STENT Bilateral 8/9/2024    Procedure: CYSTOSCOPY, WITH RETROGRADE PYELOGRAM AND URETERAL STENT INSERTION;  Surgeon: Dena Paula MD;  Location: [facility] OR;  Service: Urology;  Laterality: Bilateral;  RN PREOP 8/2/2024    CYSTOSTOMY W/ STENT INSERTION      multiple episodes since 2005    CYSTOURETEROSCOPY WITH RETROGRADE PYELOGRAPHY AND INSERTION OF STENT INTO URETER Bilateral 8/14/2020    Procedure: CYSTOURETEROSCOPY, WITH RETROGRADE PYELOGRAM AND URETERAL STENT INSERTION - bilateral stent exchange;  Surgeon: Dena Paula MD;  Location: [facility] OR;  Service: Urology;  Laterality: Bilateral;  RN PREOP 8/10/2020--COVID NEGATIVE ON 8/11    CYSTOURETEROSCOPY WITH RETROGRADE PYELOGRAPHY AND INSERTION OF STENT INTO URETER Bilateral 5/5/2023    Procedure: CYSTOURETEROSCOPY, WITH RETROGRADE PYELOGRAM AND URETERAL STENT INSERTION;  Surgeon: Dena Paula MD;  Location: [facility] OR;  Service: Urology;  Laterality: Bilateral;  RN PREOP  04/28/2023 --JM    ENDOMETRIAL ABLATION       REPLACEMENT OF STENT Bilateral 7/19/2019    Procedure: REPLACEMENT, STENT; cystoscopy, retrograde pyelogram;  Surgeon: Dena Paula MD;  Location: [facility];  Service: Urology;  Laterality: Bilateral;  RN PRE OP 7-12-19       Social History:  Social History[1]    Medications:  Medications Ordered Prior to Encounter[2]    Allergies:  Review of patient's allergies indicates:  No Known Allergies    Active Problems:  Problem List[3]    Diagnostic Studies:   Latest Reference Range & Units 05/09/25 11:03   WBC 3.90 - 12.70 K/uL 6.46   RBC 4.00 - 5.40 M/uL 3.46 (L)   Hemoglobin 12.0 - 16.0 gm/dL 11.2 (L)   Hematocrit 37.0 - 48.5 % 33.7 (L)   MCV 82 - 98 fL 97   MCH 27.0 - 31.0 pg 32.4 (H)   MCHC 32.0 - 36.0 g/dL 33.2   RDW 11.5 - 14.5 % 11.9   Platelet Count 150 - 450 K/uL 300      Latest Reference Range & Units 05/09/25 11:03   Sodium 136 - 145 mmol/L 137   Potassium 3.5 - 5.1 mmol/L 4.2   Chloride 95 - 110 mmol/L 103   CO2 23 - 29 mmol/L 26   Anion Gap 8 - 16 mmol/L 8   BUN 6 - 20 mg/dL 19   Creatinine 0.5 - 1.4 mg/dL 0.9   eGFR >60 mL/min/1.73/m2 >60     EKG (5/9/25):  Normal sinus rhythm   Nonspecific T wave abnormality     24 Hour Vitals:  Temp:  [36.4 °C (97.5 °F)] 36.4 °C (97.5 °F)  Pulse:  [68] 68  Resp:  [16] 16  SpO2:  [98 %] 98 %  BP: (107)/(71) 107/71   See Nursing Charting For Additional Vitals    Pre-op Assessment    I have reviewed the Patient Summary Reports.     I have reviewed the Nursing Notes. I have reviewed the NPO Status.   I have reviewed the Medications.     Review of Systems  Anesthesia Hx:  No problems with previous Anesthesia             Denies Family Hx of Anesthesia complications.    Denies Personal Hx of Anesthesia complications.                    Social:  Non-Smoker, Social Alcohol Use       Hematology/Oncology:       -- Anemia:                    --  Cancer in past history:                 Oncology Comments: Cervical CA in past     Cardiovascular:  Exercise tolerance: good    Hypertension           hyperlipidemia       Functional Capacity good / => 4 METS                         Pulmonary:  Pulmonary Normal                       Renal/:  Chronic Renal Disease   Obstruction of both ureters, Ureteral stricture     Interstitial cystitis               Hepatic/GI:  Hepatic/GI Normal                    Neurological:  Neurology Normal                                      Endocrine:  Diabetes               Physical Exam  General: Well nourished and Cooperative    Airway:  Mallampati: II   Mouth Opening: Normal  TM Distance: Normal    Dental:  Intact    Chest/Lungs:  Clear to auscultation, Normal Respiratory Rate    Heart:  Rate: Normal  Rhythm: Regular Rhythm        Anesthesia Plan  Type of Anesthesia, risks & benefits discussed:    Anesthesia Type: MAC, Gen ETT, Gen Supraglottic Airway  Intra-op Monitoring Plan: Standard ASA Monitors  Post Op Pain Control Plan: multimodal analgesia and IV/PO Opioids PRN  Induction:  IV  Informed Consent: Informed consent signed with the Patient and all parties understand the risks and agree with anesthesia plan.  All questions answered.   ASA Score: 2  Anesthesia Plan Notes: MAC    Ready For Surgery From Anesthesia Perspective.     .           [1]   Social History  Socioeconomic History    Marital status: Single   Tobacco Use    Smoking status: Never    Smokeless tobacco: Never   Substance and Sexual Activity    Alcohol use: Yes     Comment: social    Drug use: Never    Sexual activity: Yes     Partners: Male     Social Drivers of Health     Food Insecurity: No Food Insecurity (7/21/2024)    Hunger Vital Sign     Worried About Running Out of Food in the Last Year: Never true     Ran Out of Food in the Last Year: Never true   Physical Activity: Sufficiently Active (7/21/2024)    Exercise Vital Sign     Days of Exercise per Week: 7 days     Minutes of Exercise per Session: 60 min   Stress: No Stress Concern Present (7/21/2024)    [facility]  Occupational Health - Occupational Stress Questionnaire     Feeling of Stress : Not at all   Housing Stability: Unknown (7/21/2024)    Housing Stability Vital Sign     Unable to Pay for Housing in the Last Year: Patient declined   [2]   No current facility-administered medications on file prior to encounter.     Current Outpatient Medications on File Prior to Encounter   Medication Sig Dispense Refill    atorvastatin (LIPITOR) 20 MG tablet Take 20 mg by mouth once daily.      lisinopril 10 MG tablet Take 10 mg by mouth once daily.   0    metFORMIN (GLUCOPHAGE) 500 MG tablet Take 500 mg by mouth 2 (two) times daily with meals.       methen-m.blue-s.phos-phsal-hyo (URIBEL) 118-10-40.8-36 mg Cap Take 1 capsule by mouth 3 (three) times daily as needed (bladder pain). 40 capsule 11    estradioL (ESTRACE) 0.01 % (0.1 mg/gram) vaginal cream Place 1 g vaginally twice a week. 42.5 g 11    TRUE METRIX GLUCOSE TEST STRIP Strp USE 1 STRIP TO CHECK GLUCOSE THREE TIMES DAILY  5    TRUEPLUS LANCETS 33 gauge Misc USE 1 TO CHECK GLUCOSE THREE TIMES DAILY  5   [3]   Patient Active Problem List  Diagnosis    Ureteral stricture    Hydronephrosis    Stricture or kinking of ureter    Incomplete bladder emptying    Urinary tract infection (UTI)    Nocturia    Interstitial cystitis    Ureter, stricture    Retroperitoneal fibrosis    Gross hematuria    Ureteral stent displacement    Retained ureteral stent    Ureteral obstruction    Obstruction of both ureters

## 2025-05-09 NOTE — DISCHARGE INSTRUCTIONS
YOUR PROCEDURE WILL BE AT [facility] at [address], [place]. [id number]                 Enter through the Main Entrance facing [address].      Your procedure  is scheduled for ___5/16/2025_______.    Call [phone] between 2pm and 5pm on _5/15/2025______to find out your arrival time for the day of surgery.    You may have up to three visitors.  No children under 18 years old.     You will be going to the Same Day Surgery Unit on the 2nd floor of the hospital.    Report to the Same Day Surgery Registration Desk in the hallway.(Just beside the Same Day Surgery Unit)                    DO NOT PARK IN THE GARAGE.  THERE IS NO OPEN ENTRANCE TO THE HOSPITAL BUILDING AND NO ELEVATOR.      Important instructions:  Do not eat anything after midnight.  You may have plain water, non carbonated.  You may also have Gatorade or Powerade after midnight.    Stop all fluids 2 hours before your surgery.    It is okay to brush your teeth.  Do not have gum, candy or mints.    SEE MEDICATION SHEET.   TAKE MEDICATIONS AS DIRECTED.    Do not take any diabetic medication on the morning of surgery unless instructed to do so by your doctor or pre op nurse.    All GLP-1 weekly diabetic/weight loss medications must not be taken for one week before your surgery, or your surgery could be canceled.      STOP taking for 7 days before surgery:    Aspirin           Voltaren (Diclofenac)  Ibuprofen  (Advil, Motrin)                Indomethocin  Mobic (meloxicam, celebrex)      Etodolac   Aleve (naproxen)          Toradol (ketoralac)  Fish oil, Krill oil and Vitamin E  Headache Powders (BC Powder, Goody's Powder, Stanback)                           You may take Tylenol if needed which is not a blood thinner.    Please shower the night before and the morning of your surgery.      Contact lenses and removable denture work may not be worn during your procedure.    You may wear deodorant only. If you are  having breast surgery, do not wear deodorant on the operative side.    Do not wear powder, body lotion, perfume/cologne or make-up.    Do not wear any jewelry or have any metal on your body.    You will be asked to remove any dentures or partials for the procedure.    If you are going home on the same day of surgery, you must arrange for a family member or a friend to drive you home.  Public transportation is prohibited.  You will not be able to drive home if you were given anesthesia or sedation.    Patients who want to have their Post-op prescriptions filled from our in-house [facility], bring a Credit/Debit Card or cash with you. A co-pay may be required.  The pharmacy closes at 5:30 pm.    Wear loose fitting clothes allowing for bandages.    Please leave money and valuables home.      You may bring your cell phone.    Call the doctor if fever or illness should occur before your surgery.    Call [phone] to contact us here if needed.                            CLOTHES ON DAY OF SURGERY    SHOULDER surgery:  you must have a very oversized shirt.  Very, Very large.  You will probably have a large sling on with your arm strapped to your chest.  You will not be able to put the arm of the operated shoulder into a sleeve.  You can put the arm of the un-operated shoulder into the sleeve, but the shirt will need to be draped over the operated shoulder.       ARM or HAND surgery:  make sure that your sleeves are large and loose enough to pass over large dressings or cast.      BREAST or UNDERARM surgery:  wear a loose, button down shirt so that you can dress without raising your arms over your head.    ABDOMINAL surgery:  wear loose, comfortable clothing.  Nothing tight around the abdomen.  NO JEANS    PENIS or SCROTAL surgery:  loose comfortable clothing.  Large sweat pants, pajama pants or a robe.  ABSOLUTELY NO JEANS      LEG or FOOT surgery:  wear large loose pants that are able to pass over any large dressings  or casts.  You could also wear loose shorts or a skirt.

## 2025-05-15 ENCOUNTER — TELEPHONE (OUTPATIENT)
Dept: SURGERY | Facility: HOSPITAL | Age: 60
End: 2025-05-15
Payer: MEDICAID

## 2025-05-16 ENCOUNTER — ANESTHESIA (OUTPATIENT)
Dept: SURGERY | Facility: HOSPITAL | Age: 60
End: 2025-05-16
Payer: MEDICAID

## 2025-05-16 ENCOUNTER — HOSPITAL ENCOUNTER (OUTPATIENT)
Facility: HOSPITAL | Age: 60
Discharge: HOME | End: 2025-05-16
Attending: UROLOGY | Admitting: UROLOGY
Payer: MEDICAID

## 2025-05-16 DIAGNOSIS — N13.5 OBSTRUCTION OF BOTH URETERS: Primary | ICD-10-CM

## 2025-05-16 DIAGNOSIS — Z01.818 PREOPERATIVE TESTING: ICD-10-CM

## 2025-05-16 DIAGNOSIS — Z96.0 RETAINED URETERAL STENT: ICD-10-CM

## 2025-05-16 DIAGNOSIS — N13.5 URETERAL STRICTURE: ICD-10-CM

## 2025-05-16 LAB
ESTROGEN SERPL-MCNC: NORMAL PG/ML
INSULIN SERPL-ACNC: NORMAL U[IU]/ML
LAB AP CLINICAL INFORMATION: NORMAL
LAB AP GROSS DESCRIPTION: NORMAL
LAB AP PERFORMING LOCATION(S): NORMAL
LAB AP REPORT FOOTNOTES: NORMAL
POCT GLUCOSE: 144 MG/DL (ref 70–110)

## 2025-05-16 PROCEDURE — 37000008 HC ANESTHESIA 1ST 15 MINUTES: Performed by: UROLOGY

## 2025-05-16 PROCEDURE — 25500020 PHARM REV CODE 255: Performed by: UROLOGY

## 2025-05-16 PROCEDURE — C1758 CATHETER, URETERAL: HCPCS | Performed by: UROLOGY

## 2025-05-16 PROCEDURE — C2617 STENT, NON-COR, TEM W/O DEL: HCPCS | Performed by: UROLOGY

## 2025-05-16 PROCEDURE — 71000016 HC POSTOP RECOV ADDL HR: Performed by: UROLOGY

## 2025-05-16 PROCEDURE — 36000706: Performed by: UROLOGY

## 2025-05-16 PROCEDURE — 88300 SURGICAL PATH GROSS: CPT | Mod: 26,,, | Performed by: PATHOLOGY

## 2025-05-16 PROCEDURE — 36000707: Performed by: UROLOGY

## 2025-05-16 PROCEDURE — 74420 UROGRAPHY RTRGR +-KUB: CPT | Mod: 26,,, | Performed by: UROLOGY

## 2025-05-16 PROCEDURE — 52332 CYSTOSCOPY AND TREATMENT: CPT | Mod: 50,,, | Performed by: UROLOGY

## 2025-05-16 PROCEDURE — C1769 GUIDE WIRE: HCPCS | Performed by: UROLOGY

## 2025-05-16 PROCEDURE — 63600175 PHARM REV CODE 636 W HCPCS: Performed by: NURSE ANESTHETIST, CERTIFIED REGISTERED

## 2025-05-16 PROCEDURE — 88300 SURGICAL PATH GROSS: CPT | Mod: TC | Performed by: UROLOGY

## 2025-05-16 PROCEDURE — 71000015 HC POSTOP RECOV 1ST HR: Performed by: UROLOGY

## 2025-05-16 PROCEDURE — 71000033 HC RECOVERY, INTIAL HOUR: Performed by: UROLOGY

## 2025-05-16 PROCEDURE — 63600175 PHARM REV CODE 636 W HCPCS: Performed by: UROLOGY

## 2025-05-16 PROCEDURE — 37000009 HC ANESTHESIA EA ADD 15 MINS: Performed by: UROLOGY

## 2025-05-16 DEVICE — URETERAL STENT
Type: IMPLANTABLE DEVICE | Site: URETER | Status: FUNCTIONAL
Brand: PERCUFLEX™

## 2025-05-16 RX ORDER — HYDROCODONE BITARTRATE AND ACETAMINOPHEN 5; 325 MG/1; MG/1
1 TABLET ORAL EVERY 4 HOURS PRN
Status: DISCONTINUED | OUTPATIENT
Start: 2025-05-16 | End: 2025-05-16 | Stop reason: HOSPADM

## 2025-05-16 RX ORDER — SODIUM CHLORIDE 0.9 % (FLUSH) 0.9 %
10 SYRINGE (ML) INJECTION
Status: DISCONTINUED | OUTPATIENT
Start: 2025-05-16 | End: 2025-05-16 | Stop reason: HOSPADM

## 2025-05-16 RX ORDER — LIDOCAINE HYDROCHLORIDE 20 MG/ML
INJECTION INTRAVENOUS
Status: DISCONTINUED | OUTPATIENT
Start: 2025-05-16 | End: 2025-05-16

## 2025-05-16 RX ORDER — PROCHLORPERAZINE EDISYLATE 5 MG/ML
5 INJECTION INTRAMUSCULAR; INTRAVENOUS EVERY 30 MIN PRN
Status: DISCONTINUED | OUTPATIENT
Start: 2025-05-16 | End: 2025-05-16 | Stop reason: HOSPADM

## 2025-05-16 RX ORDER — ACETAMINOPHEN 325 MG/1
650 TABLET ORAL EVERY 4 HOURS PRN
Status: DISCONTINUED | OUTPATIENT
Start: 2025-05-16 | End: 2025-05-16 | Stop reason: HOSPADM

## 2025-05-16 RX ORDER — PHENAZOPYRIDINE HYDROCHLORIDE 100 MG/1
200 TABLET, FILM COATED ORAL 3 TIMES DAILY PRN
Qty: 18 TABLET | Refills: 0 | Status: SHIPPED | OUTPATIENT
Start: 2025-05-16

## 2025-05-16 RX ORDER — CEPHALEXIN 500 MG/1
500 CAPSULE ORAL EVERY 12 HOURS
Qty: 4 CAPSULE | Refills: 0 | Status: SHIPPED | OUTPATIENT
Start: 2025-05-16

## 2025-05-16 RX ORDER — MIDAZOLAM HYDROCHLORIDE 1 MG/ML
INJECTION INTRAMUSCULAR; INTRAVENOUS
Status: DISCONTINUED | OUTPATIENT
Start: 2025-05-16 | End: 2025-05-16

## 2025-05-16 RX ORDER — PROPOFOL 10 MG/ML
VIAL (ML) INTRAVENOUS
Status: DISCONTINUED | OUTPATIENT
Start: 2025-05-16 | End: 2025-05-16

## 2025-05-16 RX ORDER — SODIUM CHLORIDE, SODIUM LACTATE, POTASSIUM CHLORIDE, CALCIUM CHLORIDE 600; 310; 30; 20 MG/100ML; MG/100ML; MG/100ML; MG/100ML
INJECTION, SOLUTION INTRAVENOUS CONTINUOUS
Status: DISCONTINUED | OUTPATIENT
Start: 2025-05-16 | End: 2025-05-16 | Stop reason: HOSPADM

## 2025-05-16 RX ORDER — GLUCAGON 1 MG
1 KIT INJECTION
Status: DISCONTINUED | OUTPATIENT
Start: 2025-05-16 | End: 2025-05-16 | Stop reason: HOSPADM

## 2025-05-16 RX ORDER — PHENAZOPYRIDINE HYDROCHLORIDE 100 MG/1
200 TABLET, FILM COATED ORAL 3 TIMES DAILY PRN
Status: DISCONTINUED | OUTPATIENT
Start: 2025-05-16 | End: 2025-05-16 | Stop reason: HOSPADM

## 2025-05-16 RX ORDER — HYDROCODONE BITARTRATE AND ACETAMINOPHEN 5; 325 MG/1; MG/1
1 TABLET ORAL EVERY 6 HOURS PRN
Qty: 3 TABLET | Refills: 0 | Status: SHIPPED | OUTPATIENT
Start: 2025-05-16

## 2025-05-16 RX ORDER — HYDROMORPHONE HYDROCHLORIDE 2 MG/ML
0.2 INJECTION, SOLUTION INTRAMUSCULAR; INTRAVENOUS; SUBCUTANEOUS EVERY 5 MIN PRN
Refills: 0 | Status: DISCONTINUED | OUTPATIENT
Start: 2025-05-16 | End: 2025-05-16 | Stop reason: HOSPADM

## 2025-05-16 RX ORDER — PROPOFOL 10 MG/ML
VIAL (ML) INTRAVENOUS CONTINUOUS PRN
Status: DISCONTINUED | OUTPATIENT
Start: 2025-05-16 | End: 2025-05-16

## 2025-05-16 RX ORDER — CEFAZOLIN 2 G/1
2 INJECTION, POWDER, FOR SOLUTION INTRAMUSCULAR; INTRAVENOUS
Status: COMPLETED | OUTPATIENT
Start: 2025-05-16 | End: 2025-05-16

## 2025-05-16 RX ORDER — LIDOCAINE HYDROCHLORIDE 10 MG/ML
1 INJECTION, SOLUTION EPIDURAL; INFILTRATION; INTRACAUDAL; PERINEURAL ONCE
Status: DISCONTINUED | OUTPATIENT
Start: 2025-05-16 | End: 2025-05-16 | Stop reason: HOSPADM

## 2025-05-16 RX ORDER — ONDANSETRON HYDROCHLORIDE 2 MG/ML
4 INJECTION, SOLUTION INTRAVENOUS DAILY PRN
Status: DISCONTINUED | OUTPATIENT
Start: 2025-05-16 | End: 2025-05-16 | Stop reason: HOSPADM

## 2025-05-16 RX ORDER — ONDANSETRON HYDROCHLORIDE 2 MG/ML
INJECTION, SOLUTION INTRAVENOUS
Status: DISCONTINUED | OUTPATIENT
Start: 2025-05-16 | End: 2025-05-16

## 2025-05-16 RX ORDER — FENTANYL CITRATE 50 UG/ML
INJECTION, SOLUTION INTRAMUSCULAR; INTRAVENOUS
Status: DISCONTINUED | OUTPATIENT
Start: 2025-05-16 | End: 2025-05-16

## 2025-05-16 RX ADMIN — LIDOCAINE HYDROCHLORIDE 60 MG: 20 INJECTION, SOLUTION INTRAVENOUS at 07:05

## 2025-05-16 RX ADMIN — FENTANYL CITRATE 25 MCG: 50 INJECTION, SOLUTION INTRAMUSCULAR; INTRAVENOUS at 07:05

## 2025-05-16 RX ADMIN — CEFAZOLIN 2 G: 2 INJECTION, POWDER, FOR SOLUTION INTRAMUSCULAR; INTRAVENOUS at 07:05

## 2025-05-16 RX ADMIN — PROPOFOL 100 MCG/KG/MIN: 10 INJECTION, EMULSION INTRAVENOUS at 07:05

## 2025-05-16 RX ADMIN — PROPOFOL 20 MG: 10 INJECTION, EMULSION INTRAVENOUS at 07:05

## 2025-05-16 RX ADMIN — ONDANSETRON 4 MG: 2 INJECTION, SOLUTION INTRAMUSCULAR; INTRAVENOUS at 07:05

## 2025-05-16 RX ADMIN — GLYCOPYRROLATE 0.1 MG: 0.2 INJECTION, SOLUTION INTRAMUSCULAR; INTRAVITREAL at 07:05

## 2025-05-16 RX ADMIN — MIDAZOLAM HYDROCHLORIDE 2 MG: 1 INJECTION INTRAMUSCULAR; INTRAVENOUS at 07:05

## 2025-05-16 RX ADMIN — SODIUM CHLORIDE, SODIUM LACTATE, POTASSIUM CHLORIDE, AND CALCIUM CHLORIDE: .6; .31; .03; .02 INJECTION, SOLUTION INTRAVENOUS at 06:05

## 2025-05-16 NOTE — H&P
Subjective:       Lizy Jimenez is a 59 y.o. female who is an established patient of Dr. Zaragoza was seen for evaluation of RPF.      She was last seen 12/15 by [facility]. 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 Also with diagnosis of IC (given Elmiron by [facility]). She was managed with indwelling stents that were changes q3mths since 2005. Stents were changed by [facility] 11/15. After that, she established cared with Dr Smith at .     She has not been seen by this practice in almost 2 years. Last stent exchange by [facility] in 11/15 required R URS due to displaced stent (difficult to interpret op note). She reports last stent exchange by Dr Smith was in 7/16 - she is VERY overdue for stent exchange. She did not bring any records from Dr Smith.     In review of her notes, it is very difficult to understand the etiology/workup of her strictures/RPF. She is s/p XRT for cervical cancer. She states that the stents were in place prior to XRT and were done due to kidney stones. She was offered reimplantation but she did not want to do this due to down time from surgery.      Stent exchange (uncomplicated) on 11/17/17. R - 6Fr x 22cm. LUP - 6Fr x 22cm, LLP - 6Fr x 20cm     8/27/2019  She was lost to follow up again and eventually had stents exchanged 7/19/19 without much difficulty. Prior stent exchange was 11/17. Complete L ureteral duplication.     7/27/2020  Again lost to follow up for stent exchange. One year since last exchange (7/19/19). Now with UTI symptoms. Two stents on L due to duplication.    9/13/2021  Yet again, lost to follow up for stent exchange. Last done 8/14/20. Now with dysuria. Still declines definitive reimplantation/ureterolysis surgery or further workup.     1/11/2022  Occasional bladder spasms and pain in SP area.     4/6/2023  Here to arrange stent exchange. Last done 10/8/21. Denies stent related issues.       6/12/2023  Stents exchanged 5/5/23. Occasional dysuria.     7/22/2024  Here to arrange stent exchange. No UTIs. No LUTS. Using Uribel PRN.     4/7/2025  Last stent exchange 8/9/24. Here with microhematuria that was found during physical. States she was given abx for this. Denies UTI symptoms now and before abx.     R - 6Fr x 22cm JJ stent.   L UP - 6Fr x 24cm JJ stent.   L LP - 6Fr x 20cm JJ stent.       The following portions of the patient's history were reviewed and updated as appropriate: allergies, current medications, past family history, past medical history, past social history, past surgical history and problem list.    Review of Systems  Constitutional: no fever or chills  ENT: no nasal congestion or sore throat  Respiratory: no cough or shortness of breath  Cardiovascular: no chest pain or palpitations  Gastrointestinal: no nausea or vomiting, tolerating diet  Genitourinary: as per HPI  Hematologic/Lymphatic: no easy bruising or lymphadenopathy  Musculoskeletal: no arthralgias or myalgias  Skin: no rashes or lesions  Neurological: no seizures or tremors  Behavioral/Psych: no auditory or visual hallucinations        Objective:    Vitals: /71 (BP Location: Left arm, Patient Position: Lying)   Pulse 68   Temp 97.5 °F (36.4 °C) (Oral)   Resp 16   Wt 62.2 kg (137 lb 3.2 oz)   SpO2 98%   Breastfeeding No   BMI 20.26 kg/m²     Physical Exam   General: well developed, well nourished in no acute distress  Head: normocephalic, atraumatic  Neck: supple, trachea midline, no obvious enlargement of thyroid  HEENT: EOMI, mucus membranes moist, sclera anicteric, no hearing impairment  Lungs: symmetric expansion, non-labored breathing  Neuro: alert and oriented x 3, no gross deficits  Psych: normal judgment and insight, normal mood/affect and non-anxious  Genitourinary: deferred      Lab Review   Urine analysis today in clinic shows - ++LE, 30 protein, 250 RBCs    Lab Results   Component Value Date    WBC  6.46 05/09/2025    HGB 11.2 (L) 05/09/2025    HGB 12.0 08/02/2024    HCT 33.7 (L) 05/09/2025    HCT 37.1 08/02/2024    MCV 97 05/09/2025    MCV 97 08/02/2024     05/09/2025     08/02/2024     Lab Results   Component Value Date    CREATININE 0.9 05/09/2025    BUN 19 05/09/2025       Imaging  Images and reports were personally reviewed by me and discussed with patient         Assessment/Plan:      1. Retained ureteral stent    - Stent exchange delayed >1 year   - Stents exchanged by myself on 11/17/17 without complication despite prolonged indwelling time. Minimal encrustation. Similar procedure done 7/2019 with prolonged indwelling time.    - Discussed referral to Dr Santacruz for definitive repair   - Complete duplication on L   - Stents exchanged 10/8/21, 5/5/23, 8/9/24   - OR 5/16/25 for stent exchange.    Microhematuria expected with the presence of indwelling JJ stents.      2. Retroperitoneal fibrosis    - Unsure how this was diagnosed     3. Ureteral stricture    - Due to stones or XRT - unsure at this time     4. Interstitial cystitis    - Unsure diagnosis   - Was on Elmiron     5. Bladder pain    - UCx clear previously, resend today due to symptoms   - Uribel PRN    6. Dyspareunia   - Suspect related to XRT   - Estrace 2x weekly   - Discussed PFPT      Follow up in 10 months

## 2025-05-16 NOTE — DISCHARGE INSTRUCTIONS
Expect blood and/or burning with urination. Drink plenty of fluids.    ACTIVITY LEVEL: If you have received sedation or an anesthetic, you may feel sleepy for several hours. Rest  until you are more awake. Gradually resume your normal activities.    DIET: You may resume your home diet. If nausea is present, increase your diet gradually with fluids and bland  foods.    Medications: Pain medication should be taken only if needed and as directed. If antibiotics are prescribed, the  medication should be taken until completed. You will be given an updated list of you medications.    No driving, alcoholic beverages or signing legal documents for next 24  hours or while taking pain medication    CALL THE DOCTOR:  Fever over 101°F  Severe pain that doesnt go away with medication.  Upset stomach and vomiting that is persistent.  Problems urinating-unable to urinate or heavy bleeding (with or without clots)    Fall Prevention  Millions of people fall every year and injure themselves. You may have had anesthesia or sedation which may increase your risk of falling. You may have health issues that put you at an increased risk of falling.     Here are ways to reduce your risk of falling.    Make your home safe by keeping walkways clear of objects you may trip over.  Use non-slip pads under rugs. Do not use area rugs or small throw rugs.  Use non-slip mats in bathtubs and showers.  Install handrails and lights on staircases.  Do not walk in poorly lit areas.  Do not stand on chairs or wobbly ladders.  Use caution when reaching overhead or looking upward. This position can cause a loss of balance.  Be sure your shoes fit properly, have non-slip bottoms and are in good condition.   Wear shoes both inside and out. Avoid going barefoot or wearing slippers.  Be cautious when going up and down stairs, curbs, and when walking on uneven sidewalks.  If your balance is poor, consider using a cane or walker.  If your fall was related to  alcohol use, stop or limit alcohol intake.   If your fall was related to use of sleeping medicines, talk to your doctor about this. You may need to reduce your dosage at bedtime if you awaken during the night to go to the bathroom.    To reduce the need for nighttime bathroom trips:  Avoid drinking fluids for several hours before going to bed  Empty your bladder before going to bed  Men can keep a urinal at the bedside  Stay as active as you can. Balance, flexibility, strength, and endurance all come from exercise. They all play a role in preventing falls. Ask your healthcare provider which types of activity are right for you.  Get your vision checked on a regular basis.  If you have pets, know where they are before you stand up or walk so you don't trip over them.  Use night lights.

## 2025-05-16 NOTE — OP NOTE
[facility] [place] [facility]  Surgery Department  Urology Operative Note    SUMMARY     Date of Procedure: 5/16/2025     Surgeons and Role:     * Dena Paula MD - Primary    Assisting Surgeon: None    Pre-Operative Diagnosis: Retained ureteral stent [Z96.0]  Obstruction of both ureters [N13.5]  Ureteral stricture [N13.5]    Post-Operative Diagnosis: Post-Op Diagnosis Codes:     * Retained ureteral stent [Z96.0]     * Obstruction of both ureters [N13.5]     * Ureteral stricture [N13.5]    Procedure: Procedure(s) (LRB):  Cystoscopy  Bilateral retrograde pyelogram  Bilateral ureteral stent exchange - single stent on right, two stents on left in complete duplicated collecting system.     Anesthesia: Monitor Anesthesia Care    Indication for Procedure: 58yo F with known retroperitoneal fibrosis/ureteral strictures.  She has been managed with indwelling ureteral stents.  She presents now after extended period of retained stents.  Her last stent exchange was in 8/2024.  She presents today for stent exchange.  She understands possible complications due to her retained stents.  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     Description of Procedure: The patient was brought to operating room and placed under general anesthesia. Full time out procedures were performed identifying correct patient, procedure and laterality. Appropriate antibiotics with Ancef were given prior to commencement of surgery. The patient was placed in dorsal lithotomy position and prepped and draped in the usual sterile fashion.     A 22Fr rigid cystoscopy was placed per urethral and passed into the bladder. Urethra retracted and rigid consistent with her history of pelvic radiation. No urethral strictures were noted. Cystoscopy did not reveal any abnormality of the bladder other than previously placed stents.  film was obtained showing bilateral ureteral stents, two on left.         The stents were examined and were noted  to be dark in nature, but had no stone encrustation.  First, the right side was addressed.  The stent was grasped and brought to the urethral meatus.  A wire was passed through the lumen of the stent and up to the level of the kidney as confirmed on fluoroscopy.  The stent was removed in its entirety without resistance. Next, a dual lumen exchange catheter was passed over the wire and a retrograde pyelogram was performed with full strength Omnipaque solution.  There was noted to be normal caliber distal ureter and mild hydroureteronephrosis proximal to this.  She was noted to have a partially duplicated system, but one ureter down from the proximal ureter down to the bladder.  Next, a cystoscope was replaced back into the bladder over the wire.  A 6-Lithuanian x 22 cm double-J ureteral stent with no string was then passed over the wire and up to the level of the kidney.  The wire was removed and good curls noted in the proximal and distal portion of the stent.     Similar procedure was then performed on the patient's left side.  Initially, the upper pole/medial ureteral orifice was addressed. The stent was grasped with flexible graspers and brought out to the urethral meatus.  The wire was able to be passed through the lumen of the stent and up to the level of the upper pole of the left kidney. The stent was then removed.  A retrograde pyelogram was performed with a dual-lumen exchange catheter that showed with narrowed distal ureter and moderate upper pole hydronephrosis. Cystoscope was replaced back into the bladder and a 6 Lithuanian by 24 cm double-J ureteral stent was passed over the wire into the upper pole moiety.        A similar procedure was performed for the lower pole moiety where stent was grasped and brought to the urethral meatus.  A wire passed through the lumen of the stent and up to the lower pole moiety of the left kidney. Stent removed without resistance.  No encrustation on stent..Retrograde pyelogram  was performed showing moderate proximal hydronephrosis of the lower pole moiety. The cystoscope was replaced back into the bladder and a 6-Syriac x 20 cm JJ stent was then passed over the wire and up to the level of the kidney.  The wire was removed and coil was noted in the proximal portion and distal portion of the stent.      At the conclusion of the procedure, all 3 stents were in the proper position.  There was minimal to no hematuria noted from the ureters. The bladder was then drained and the cystoscope was removed.  The patient was awakened from general anesthesia and transferred to the PACU in stable condition.        Findings: Retracted urethra. No encrustation noted on stents.  Stents exchanged without issue.  Two stents placed on left in complete duplication of collecting system     Complications: No     Estimated Blood Loss (EBL): <5cc     Drains: R - 6Fr x 22cm JJ stent, LUP - 6Fr x 24cm JJ stent, LLP - 6Fr x 20cm JJ stent    Complications: No          Implants:   Implant Name Type Inv. Item Serial No.  Lot No. LRB No. Used Action   STENT URET PERCUFLEX 6FR 22CM - [id number]  STENT URET PERCUFLEX 6FR 22CM  [employer] [id number] Right 1 Implanted       Specimens:  Bilateral ureteral stents  Specimen (24h ago, onward)      None                    Condition: Good    Disposition: PACU - hemodynamically stable.    Attestation: I was present and scrubbed for the entire procedure.    Discharge Note    SUMMARY     Admit Date: 5/16/2025    Discharge Date and Time: 05/16/2025 7:08 AM    Hospital Course (synopsis of major diagnoses, care, treatment, and services provided during the course of the hospital stay): Uncomplicated stent exchange     Final Diagnosis: Post-Op Diagnosis Codes:     * Retained ureteral stent [Z96.0]     * Obstruction of both ureters [N13.5]     * Ureteral stricture [N13.5]    Disposition: Home or Self Care    Follow Up/Patient Instructions:     Medications:  Reconciled Home  Medications:      Medication List        START taking these medications      cephALEXin 500 MG capsule  Commonly known as: KEFLEX  Take 1 capsule (500 mg total) by mouth every 12 (twelve) hours.     HYDROcodone-acetaminophen 5-325 mg per tablet  Commonly known as: NORCO  Take 1 tablet by mouth every 6 (six) hours as needed for Pain.     phenazopyridine 100 MG tablet  Commonly known as: PYRIDIUM  Take 2 tablets (200 mg total) by mouth 3 (three) times daily as needed for Pain.            CONTINUE taking these medications      atorvastatin 20 MG tablet  Commonly known as: LIPITOR  Take 20 mg by mouth once daily.     clotrimazole 1 % cream  Commonly known as: LOTRIMIN  Apply topically 2 (two) times daily.     estradioL 0.01 % (0.1 mg/gram) vaginal cream  Commonly known as: ESTRACE  Place 1 g vaginally twice a week.     fluticasone propionate 50 mcg/actuation nasal spray  Commonly known as: FLONASE  1 spray by Each Nostril route daily as needed.     gabapentin 300 MG capsule  Commonly known as: NEURONTIN  Take 300 mg by mouth daily as needed.     latanoprost 0.005 % ophthalmic solution  Place 1 drop into both eyes every evening.     lisinopriL 10 MG tablet  Take 10 mg by mouth once daily.     metFORMIN 500 MG tablet  Commonly known as: GLUCOPHAGE  Take 500 mg by mouth 2 (two) times daily with meals.     methen-m.blue-s.phos-phsal-hyo 118-10-40.8-36 mg Cap  Commonly known as: URIBEL  Take 1 capsule by mouth 3 (three) times daily as needed (bladder pain).     TRUE METRIX GLUCOSE TEST STRIP Strp  Generic drug: blood sugar diagnostic  USE 1 STRIP TO CHECK GLUCOSE THREE TIMES DAILY     TRUEPLUS LANCETS 33 gauge Misc  Generic drug: lancets  USE 1 TO CHECK GLUCOSE THREE TIMES DAILY            Discharge Procedure Orders   Diet general     No dressing needed     Call MD for:  temperature >100.4     Call MD for:  persistent nausea and vomiting     Call MD for:  severe uncontrolled pain     Call MD for:  difficulty breathing,  headache or visual disturbances     Activity as tolerated      Follow-up Information       Dena Paula MD Follow up in 6 week(s).    Specialty: Urology  Why: For post-op follow up  Contact information:  [address]  [address]  [facility] [address] [phone]

## 2025-05-16 NOTE — TRANSFER OF CARE
Anesthesia Transfer of Care Note    Patient: Lizy Jmienez    Procedure(s) Performed: Procedure(s) (LRB):  CYSTOSCOPY, WITH RETROGRADE PYELOGRAM AND URETERAL STENT INSERTION (Bilateral)    Patient location: PACU    Anesthesia Type: general    Transport from OR: Transported from OR on 2-3 L/min O2 by NC with adequate spontaneous ventilation    Post pain: adequate analgesia    Post assessment: no apparent anesthetic complications and tolerated procedure well    Post vital signs: stable    Level of consciousness: sedated and responds to stimulation    Nausea/Vomiting: no nausea/vomiting    Complications: none    Transfer of care protocol was followed    Last vitals: Visit Vitals  /70 (BP Location: Right arm)   Pulse 65   Temp 36.5 °C (97.7 °F) (Temporal)   Resp 17   Wt 62.2 kg (137 lb 3.2 oz)   SpO2 100%   Breastfeeding No   BMI 20.26 kg/m²

## 2025-05-19 VITALS
TEMPERATURE: 97 F | RESPIRATION RATE: 18 BRPM | WEIGHT: 137.19 LBS | DIASTOLIC BLOOD PRESSURE: 76 MMHG | OXYGEN SATURATION: 98 % | SYSTOLIC BLOOD PRESSURE: 119 MMHG | HEART RATE: 69 BPM | BODY MASS INDEX: 20.26 KG/M2

## 2025-05-19 NOTE — ANESTHESIA POSTPROCEDURE EVALUATION
Anesthesia Post Evaluation    Patient: Lizy Jimenez    Procedure(s) Performed: Procedure(s) (LRB):  CYSTOSCOPY, WITH RETROGRADE PYELOGRAM AND URETERAL STENT EXCHANGE (Bilateral)    Final Anesthesia Type: general      Patient location during evaluation: PACU  Patient participation: Yes- Able to Participate  Level of consciousness: awake and alert and oriented  Post-procedure vital signs: reviewed and stable  Pain management: adequate  Airway patency: patent    PONV status at discharge: No PONV  Anesthetic complications: no      Cardiovascular status: hemodynamically stable and blood pressure returned to baseline  Respiratory status: spontaneous ventilation, room air and unassisted  Hydration status: euvolemic  Follow-up not needed.              Vitals Value Taken Time   /76 05/16/25 09:46   Temp 36.3 °C (97.4 °F) 05/16/25 08:48   Pulse 69 05/16/25 09:46   Resp 18 05/16/25 09:46   SpO2 98 % 05/16/25 09:46         Event Time   Out of Recovery 08:44:24         Pain/Nick Score: No data recorded

## (undated) DEVICE — CATH URETERAL ANES 10FR 50CM

## (undated) DEVICE — SENSOR DUAL FLEX STR 150CM

## (undated) DEVICE — CANISTER SUCTION 2 LTR

## (undated) DEVICE — BLANKET UPPER BODY 78.7X29.9IN

## (undated) DEVICE — SYR 10CC LUER LOCK

## (undated) DEVICE — SOL IRR NACL .9% 3000ML

## (undated) DEVICE — POSITIONER HEAD DONUT 9IN FOAM

## (undated) DEVICE — SYR ONLY LUER LOCK 20CC

## (undated) DEVICE — CANISTER SUCTION RIGID 2000CC

## (undated) DEVICE — Device

## (undated) DEVICE — GLOVE SURGICAL LATEX SZ 6.5

## (undated) DEVICE — ELECTRODE REM PLYHSV RETURN 9

## (undated) DEVICE — MAT QUICK 40X30 FLOOR FLUID LF

## (undated) DEVICE — CATH URTRL OPEN END STR TIP 5F

## (undated) DEVICE — COVER SNAP KAP 26IN

## (undated) DEVICE — SOL WATER STERILE IRR 500ML

## (undated) DEVICE — GOWN NONREINF SET-IN SLV XL

## (undated) DEVICE — CATH URETERAL DUAL LUMEN 10FR

## (undated) DEVICE — SEE MEDLINE ITEM 152532

## (undated) DEVICE — SOL NACL IRR 3000ML

## (undated) DEVICE — SOL IRR STRL WATER 500ML

## (undated) DEVICE — BAG PRESSURE INFUSER 3000CC

## (undated) DEVICE — SUPPORT ULNA NERVE PROTECTOR

## (undated) DEVICE — TUBING SUC UNIV W/CONN 12FT

## (undated) DEVICE — GUIDE WIRE MOTION .035 X 150CM

## (undated) DEVICE — CONTAINER SPECIMEN STRL 4OZ

## (undated) DEVICE — KIT CATH URTRL CONE TIP 5F

## (undated) DEVICE — GLOVE SIGNATURE ESSNTL LTX 6.5

## (undated) DEVICE — CONTAINER SPECIMEN OR STER 4OZ

## (undated) DEVICE — WIRE GUIDE .035 150CM.

## (undated) DEVICE — GLOVE BIOGEL PI MICRO SZ 7

## (undated) DEVICE — SEE MEDLINE ITEM 157110